# Patient Record
Sex: FEMALE | Race: WHITE | NOT HISPANIC OR LATINO | Employment: FULL TIME | URBAN - METROPOLITAN AREA
[De-identification: names, ages, dates, MRNs, and addresses within clinical notes are randomized per-mention and may not be internally consistent; named-entity substitution may affect disease eponyms.]

---

## 2018-01-20 ENCOUNTER — HOSPITAL ENCOUNTER (EMERGENCY)
Facility: HOSPITAL | Age: 53
Discharge: HOME | End: 2018-01-20
Attending: EMERGENCY MEDICINE | Admitting: EMERGENCY MEDICINE
Payer: COMMERCIAL

## 2018-01-20 VITALS
TEMPERATURE: 97.1 F | HEART RATE: 56 BPM | OXYGEN SATURATION: 90 % | HEIGHT: 65 IN | WEIGHT: 175 LBS | BODY MASS INDEX: 29.16 KG/M2 | DIASTOLIC BLOOD PRESSURE: 58 MMHG | RESPIRATION RATE: 19 BRPM | SYSTOLIC BLOOD PRESSURE: 96 MMHG

## 2018-01-20 DIAGNOSIS — R51.9 HEADACHE: Primary | ICD-10-CM

## 2018-01-20 DIAGNOSIS — R42 DIZZINESS: ICD-10-CM

## 2018-01-20 LAB
ANION GAP BLD CALC-SCNC: 15 MMOL/L (ref 4–13)
BUN BLD-MCNC: 9 MG/DL (ref 5–25)
CA-I BLD-SCNC: 1.08 MMOL/L (ref 1.12–1.32)
CHLORIDE BLD-SCNC: 102 MMOL/L (ref 100–108)
CREAT BLD-MCNC: 0.6 MG/DL (ref 0.6–1.3)
GFR SERPL CREATININE-BSD FRML MDRD: 104 ML/MIN/1.73SQ M
GLUCOSE SERPL-MCNC: 104 MG/DL (ref 65–140)
HCT VFR BLD CALC: 41 % (ref 34.8–46.1)
HGB BLDA-MCNC: 13.9 G/DL (ref 11.5–15.4)
PCO2 BLD: 28 MMOL/L (ref 21–32)
POTASSIUM BLD-SCNC: 3.9 MMOL/L (ref 3.5–5.3)
SODIUM BLD-SCNC: 139 MMOL/L (ref 136–145)
SPECIMEN SOURCE: ABNORMAL
SPECIMEN SOURCE: NORMAL
TROPONIN I BLD-MCNC: 0.01 NG/ML (ref 0–0.08)

## 2018-01-20 PROCEDURE — 93005 ELECTROCARDIOGRAM TRACING: CPT | Performed by: EMERGENCY MEDICINE

## 2018-01-20 PROCEDURE — 85014 HEMATOCRIT: CPT

## 2018-01-20 PROCEDURE — 96374 THER/PROPH/DIAG INJ IV PUSH: CPT

## 2018-01-20 PROCEDURE — 84484 ASSAY OF TROPONIN QUANT: CPT

## 2018-01-20 PROCEDURE — 80047 BASIC METABLC PNL IONIZED CA: CPT

## 2018-01-20 PROCEDURE — 99284 EMERGENCY DEPT VISIT MOD MDM: CPT

## 2018-01-20 PROCEDURE — 93005 ELECTROCARDIOGRAM TRACING: CPT

## 2018-01-20 PROCEDURE — 96361 HYDRATE IV INFUSION ADD-ON: CPT

## 2018-01-20 RX ORDER — METOCLOPRAMIDE HYDROCHLORIDE 5 MG/ML
10 INJECTION INTRAMUSCULAR; INTRAVENOUS ONCE
Status: COMPLETED | OUTPATIENT
Start: 2018-01-20 | End: 2018-01-20

## 2018-01-20 RX ADMIN — SODIUM CHLORIDE 1000 ML: 0.9 INJECTION, SOLUTION INTRAVENOUS at 22:08

## 2018-01-20 RX ADMIN — METOCLOPRAMIDE 10 MG: 5 INJECTION, SOLUTION INTRAMUSCULAR; INTRAVENOUS at 22:51

## 2018-01-21 LAB
ATRIAL RATE: 60 BPM
P AXIS: 13 DEGREES
PR INTERVAL: 144 MS
QRS AXIS: 25 DEGREES
QRSD INTERVAL: 82 MS
QT INTERVAL: 472 MS
QTC INTERVAL: 472 MS
T WAVE AXIS: 8 DEGREES
VENTRICULAR RATE: 60 BPM

## 2018-01-21 NOTE — ED PROVIDER NOTES
History  Chief Complaint   Patient presents with    Headache     pt presents to ER stating she has had a headache and "neurological problems" pt was just at [facility], had a cat scan and fluids and was discharged, pt states she is dehydrated and needs fluids      THIS IS A 47YEAR-OLD FEMALE WHO PRESENTS FOR EVALUATION DIZZINESS LIGHTHEADEDNESS RECURRING HEADACHES OF UNDETERMINED DURATION SHE WAS JUST SEEN AT [facility] DECIDED TO STOP IN HERE BECAUSE SHE STILL FEELS DIZZY AND THINKS SHE MAY NEED SOME MORE IV FLUID  SHE DENIES ANY CHEST PAIN OR SHORTNESS OF BREATH THERE IS NO FAMILY HISTORY PREMATURE CARDIAC DISEASE IN FIRST-DEGREE RELATIVES  She does smoke and has hypertension denies hypercholesterolemia or diabetes she was evaluated at [facility] earlier today had a CT scan which shows nonspecific white matter disease differential includes microangiopathic disease demyelination Lyme disease migraines or vasculitis she was given referral to see a neurologist and a prescription for Toradol tablets for the headaches  History provided by:  Patient  Medical Problem   Location:   multiple complaints including recurrent headaches lightheadedness dizziness  Onset quality:  Unable to specify  Progression:  Waxing and waning  Chronicity:  Recurrent  Associated symptoms: headaches    Associated symptoms: no chest pain, no fever and no shortness of breath        None       History reviewed  No pertinent past medical history  Past Surgical History:   Procedure Laterality Date     SECTION      THYROIDECTOMY         History reviewed  No pertinent family history  I have reviewed and agree with the history as documented      Social History   Substance Use Topics    Smoking status: Current Every Day Smoker    Smokeless tobacco: Never Used    Alcohol use Yes      Comment: ocassionally        Review of Systems   Constitutional: Negative for fever  Respiratory: Negative for shortness of breath  Cardiovascular: Negative for chest pain  Neurological: Positive for dizziness, weakness, light-headedness and headaches  All other systems reviewed and are negative  Physical Exam  ED Triage Vitals [01/20/18 2153]   Temperature Pulse Respirations Blood Pressure SpO2   (!) 97 1 °F (36 2 °C) 56 18 (!) 183/84 96 %      Temp Source Heart Rate Source Patient Position - Orthostatic VS BP Location FiO2 (%)   Temporal Monitor Lying Left arm --      Pain Score       No Pain           Orthostatic Vital Signs  Vitals:    01/20/18 2153   BP: (!) 183/84   Pulse: 56   Patient Position - Orthostatic VS: Lying       Physical Exam   Constitutional: She is oriented to person, place, and time  She appears well-developed and well-nourished  HENT:   Head: Normocephalic and atraumatic  Right Ear: External ear normal    Left Ear: External ear normal    Nose: Nose normal    Mouth/Throat: Oropharynx is clear and moist    Eyes: Conjunctivae and EOM are normal  Pupils are equal, round, and reactive to light  No scleral icterus  Neck: Normal range of motion  Neck supple  No tracheal deviation present  Cardiovascular: Regular rhythm and intact distal pulses  Exam reveals no gallop and no friction rub  No murmur heard  Pulmonary/Chest: Effort normal and breath sounds normal  No stridor  No respiratory distress  She has no wheezes  She has no rales  Abdominal: Soft  Bowel sounds are normal  She exhibits no distension and no mass  There is no tenderness  There is no guarding  Musculoskeletal: Normal range of motion  She exhibits no edema, tenderness or deformity  Neurological: She is alert and oriented to person, place, and time  No cranial nerve deficit or sensory deficit  She exhibits normal muscle tone  Coordination normal    Skin: Skin is warm and dry  No rash noted  She is not diaphoretic     Psychiatric: Thought content normal    Anxious ED Medications  Medications   sodium chloride 0 9 % bolus 1,000 mL (1,000 mL Intravenous New Bag 1/20/18 2208)   metoclopramide (REGLAN) injection 10 mg (not administered)       Diagnostic Studies  Results Reviewed     Procedure Component Value Units Date/Time    POCT troponin [[id number]]  (Normal) Collected:  01/20/18 2211    Lab Status:  Final result Updated:  01/20/18 2225     POC Troponin I 0 01 ng/ml      Specimen Type VENOUS    Narrative:         [employer] i-Stat handheld analyzer 99% cutoff is > 0 08ng/mL in [facility]    o cTnI 99% cutoff is useful only when applied to patients in the clinical setting of myocardial ischemia  o cTnI 99% cutoff should be interpreted in the context of clinical history, ECG findings and possibly cardiac imaging to establish correct diagnosis  o cTnI 99% cutoff may be suggestive but clearly not indicative of a coronary event without the clinical setting of myocardial ischemia      POCT Chem 8+ [[id number]]  (Abnormal) Collected:  01/20/18 2209    Lab Status:  Final result Updated:  01/20/18 2225     SODIUM, I-STAT 139 mmol/l      Potassium, i-STAT 3 9 mmol/L      Chloride, istat 102 mmol/L      CO2, i-STAT 28 mmol/L      Anion Gap, Istat 15 (H) mmol/L      Calcium, Ionized i-STAT 1 08 (L) mmol/L      BUN, I-STAT 9 mg/dl      Creatinine, i-STAT 0 6 mg/dl      eGFR 104 ml/min/1 73sq m      Glucose, i-STAT 104 mg/dl      Hct, i-STAT 41 %      Hgb, i-STAT 13 9 g/dl      Specimen Type VENOUS                 No orders to display              Procedures  ECG 12 Lead Documentation  Date/Time: 1/20/2018 10:18 PM  Performed by: Angie Durant  Authorized by: Angie Durant     ECG reviewed by me, the ED Provider: yes    Patient location:  ED  Rhythm:     Rhythm: sinus rhythm    T waves:     T waves: non-specific             Phone Contacts  ED Phone Contact    ED Course  ED Course          HEART Risk Score    Flowsheet Row Most Recent Value   History  0 Filed at: 01/20/2018 2233   ECG  1 Filed at: 01/20/2018 2233   Age  1 Filed at: 01/20/2018 2233   Risk Factors  1 Filed at: 01/20/2018 2233   Troponin  0 Filed at: 01/20/2018 2233   Heart Score Risk Calculator   History  0 Filed at: 01/20/2018 2233   ECG  1 Filed at: 01/20/2018 2233   Age  1 Filed at: 01/20/2018 2233   Risk Factors  1 Filed at: 01/20/2018 2233   Troponin  0 Filed at: 01/20/2018 2233   HEART Score  3 Filed at: 01/20/2018 2233   HEART Score  3 Filed at: 01/20/2018 2233                            MDM  Number of Diagnoses or Management Options  Diagnosis management comments:  Lightheaded and dizziness differential includes neurologic etiology the demyelinating disease Lyme disease migraine vasculitis no acute focal neurologic deficit no chest pain at this time no shortness of breath will check EKG basic electrolytes give IV fluids patient will need neurology follow-up as she was given earlier today at [address] [place] [address] Rd and/or Complexity of Data Reviewed  Clinical lab tests: ordered      CritCare Time    Disposition  Final diagnoses:   Headache   Dizziness     Time reflects when diagnosis was documented in both MDM as applicable and the Disposition within this note     Time User Action Codes Description Comment    1/20/2018 10:43 PM Kristen Yoon Add [R51] Headache     1/20/2018 10:43 PM Kristen Yoon Add [R42] Dizziness       ED Disposition     ED Disposition Condition Comment    Discharge  Doneta James Saucedo discharge to home/self care  Condition at discharge: Stable        Follow-up Information     Follow up With Specialties Details Why Contact Saira Wu  In 2 days  [address] [facility]  [address] DO Miya Neurology In 2 days call monday for appointment Kena Nascimento [place] [address] MD Cardiology, Multidisciplinary In 3 days follow-up on  [facility]  [address] [facility] [place] [address]  [phone]          Patient's Medications    No medications on file     No discharge procedures on file      ED Provider  Electronically Signed by           Kaya Cash DO  01/20/18 8142

## 2018-01-21 NOTE — DISCHARGE INSTRUCTIONS
Acute Headache   WHAT YOU NEED TO KNOW:   An acute headache is pain or discomfort that starts suddenly and gets worse quickly  You may have an acute headache only when you feel stress or eat certain foods  Other acute headache pain can happen every day, and sometimes several times a day  DISCHARGE INSTRUCTIONS:   Seek care immediately if:   · You have severe pain  · You have numbness or weakness on one side of your face or body  · You have a headache that occurs after a blow to the head, a fall, or other trauma  · You have a headache, are forgetful or confused, or have trouble speaking  · You have a headache, stiff neck, and a fever  Contact your healthcare provider if:   · You have a constant headache and are vomiting  · You have a headache each day that does not get better, even after treatment  · You have changes in your headaches, or new symptoms that occur when you have a headache  · You have questions or concerns about your condition or care  Medicines: You may need any of the following:  · Prescription pain medicine  may be given  The medicine your healthcare provider recommends will depend on the kind of headaches you have  You will need to take prescription headache medicines as directed to prevent a problem called rebound headache  These headaches happen with regular use of pain relievers for headache disorders  · NSAIDs , such as ibuprofen, help decrease swelling, pain, and fever  This medicine is available with or without a doctor's order  NSAIDs can cause stomach bleeding or kidney problems in certain people  If you take blood thinner medicine, always ask your healthcare provider if NSAIDs are safe for you  Always read the medicine label and follow directions  · Acetaminophen  decreases pain and fever  It is available without a doctor's order  Ask how much to take and how often to take it  Follow directions   Read the labels of all other medicines you are using to see if they also contain acetaminophen, or ask your doctor or pharmacist  Acetaminophen can cause liver damage if not taken correctly  Do not use more than 3 grams (3,000 milligrams) total of acetaminophen in one day  · Antidepressants  may be given for some kinds of headaches  · Take your medicine as directed  Contact your healthcare provider if you think your medicine is not helping or if you have side effects  Tell him or her if you are allergic to any medicine  Keep a list of the medicines, vitamins, and herbs you take  Include the amounts, and when and why you take them  Bring the list or the pill bottles to follow-up visits  Carry your medicine list with you in case of an emergency  Manage your symptoms:   · Apply heat or ice  on the headache area  Use a heat or ice pack  For an ice pack, you can also put crushed ice in a plastic bag  Cover the pack or bag with a towel before you apply it to your skin  Ice and heat both help decrease pain, and heat also helps decrease muscle spasms  Apply heat for 20 to 30 minutes every 2 hours  Apply ice for 15 to 20 minutes every hour  Apply heat or ice for as long and for as many days as directed  You may alternate heat and ice  · Relax your muscles  Lie down in a comfortable position and close your eyes  Relax your muscles slowly  Start at your toes and work your way up your body  · Keep a record of your headaches  Write down when your headaches start and stop  Include your symptoms and what you were doing when the headache began  Record what you ate or drank for 24 hours before the headache started  Describe the pain and where it hurts  Keep track of what you did to treat your headache and if it worked  Prevent an acute headache:   · Avoid anything that triggers an acute headache  Examples include exposure to chemicals, going to high altitude, or not getting enough sleep  Create a regular sleep routine   Go to sleep at the same time and wake up at the same time each day  Do not use electronic devices before bedtime  These may trigger a headache or prevent you from sleeping well  · Do not smoke  Nicotine and other chemicals in cigarettes and cigars can trigger an acute headache or make it worse  Ask your healthcare provider for information if you currently smoke and need help to quit  E-cigarettes or smokeless tobacco still contain nicotine  Talk to your healthcare provider before you use these products  · Limit alcohol as directed  Alcohol can trigger an acute headache or make it worse  If you have cluster headaches, do not drink alcohol during an episode  For other types of headaches, ask your healthcare provider if it is safe for you to drink alcohol  Ask how much is safe for you to drink, and how often  · Exercise as directed  Exercise can reduce tension and help with headache pain  Aim for 30 minutes of physical activity on most days of the week  Your healthcare provider can help you create an exercise plan  · Eat a variety of healthy foods  Healthy foods include fruits, vegetables, low-fat dairy products, lean meats, fish, whole grains, and cooked beans  Your healthcare provider or dietitian can help you create meals plans if you need to avoid foods that trigger headaches  Follow up with your healthcare provider as directed:  Bring your headache record with you when you see your healthcare provider  Write down your questions so you remember to ask them during your visits  © 2017 [employer] Information is for End User's use only and may not be sold, redistributed or otherwise used for commercial purposes  All illustrations and images included in CareNotes® are the copyrighted property of A [employer]  or [employer]  The above information is an  only  It is not intended as medical advice for individual conditions or treatments   Talk to your doctor, nurse or pharmacist before following any medical regimen to see if it is safe and effective for you  WHAT YOU NEED TO KNOW:   Near syncope, also called presyncope, is the feeling that you may faint (lose consciousness), but you do not  You can control some health conditions that cause near syncope  Your healthcare providers can help you create a plan to manage near syncope and prevent episodes  DISCHARGE INSTRUCTIONS:   Return to the emergency department if:   · You have sudden chest pain  · You have trouble breathing or shortness of breath  · You have vision changes, are sweating, and have nausea while you are sitting or lying down  · You feel dizzy or flushed and your heart is fluttering  · You lose consciousness  · You cannot use your arm, hand, foot, or leg, or it feels weak  · You have trouble speaking or understanding others when they speak  Contact your healthcare provider if:   · You have new or worsening symptoms  · Your heart beats faster or slower than usual      · You have questions or concerns about your condition or care  Follow up with your healthcare provider as directed: You may need more tests to help find the cause of your near syncope episodes  The tests will help healthcare providers plan the best treatment for you  Write down your questions so you remember to ask them during your visits  Manage near syncope:   · Sit or lie down when needed  This includes when you feel dizzy, your throat is getting tight, and your vision changes  Raise your legs above the level of your heart  · Take slow, deep breaths if you start to breathe faster with anxiety or fear  This can help decrease dizziness and the feeling that you might faint  · Keep a record of your near syncope episodes  Include your symptoms and your activity before and after the episode  The record can help your healthcare provider find the cause of your near syncope and help you manage episodes    Prevent a near syncope episode:   · Move slowly and let yourself get used to one position before you move to another position  This is very important when you change from a lying or sitting position to a standing position  Take some deep breaths before you stand up from a lying position  Stand up slowly  Sudden movements may cause a fainting spell  Sit on the side of the bed or couch for a few minutes before you stand up  If you are on bedrest, try to be upright for about 2 hours each day, or as directed  Do not lock your legs if you are standing for a long period of time  Move your legs and bend your knees to keep blood flowing  · Follow your healthcare provider's recommendations  Your provider may  recommend that you drink more liquids to prevent dehydration  You may also need to have more salt to keep your blood pressure from dropping too low and causing syncope  Your provider will tell you how much liquid and sodium to have each day  · Watch for signs of low blood sugar  These include hunger, nervousness, sweating, and fast or fluttery heartbeats  Talk with your healthcare provider about ways to keep your blood sugar level steady  · Check your blood pressure often  This is important if you take medicine to lower your blood pressure  Check your blood pressure when you are lying down and when you are standing  Ask how often to check during the day  Keep a record of your blood pressure numbers  Your healthcare provider may use the record to help plan your treatment  · Do not strain if you are constipated  You may faint if you strain to have a bowel movement  Walking is the best way to get your bowels moving  Eat foods high in fiber to make it easier to have a bowel movement  Good examples are high-fiber cereals, beans, vegetables, and whole-grain breads  Prune juice may help make bowel movements softer  · Do not exercise outside on a hot day  The combination of physical activity and heat can lead to dehydration   This can cause syncope  © 2017 [employer] [facility] Information is for End User's use only and may not be sold, redistributed or otherwise used for commercial purposes  All illustrations and images included in CareNotes® are the copyrighted property of [employer]  or [employer]  The above information is an  only  It is not intended as medical advice for individual conditions or treatments  Talk to your doctor, nurse or pharmacist before following any medical regimen to see if it is safe and effective for you

## 2018-01-22 ENCOUNTER — HOSPITAL ENCOUNTER (EMERGENCY)
Facility: HOSPITAL | Age: 53
Discharge: HOME | End: 2018-01-22
Attending: EMERGENCY MEDICINE
Payer: COMMERCIAL

## 2018-01-22 VITALS
BODY MASS INDEX: 29.99 KG/M2 | SYSTOLIC BLOOD PRESSURE: 153 MMHG | OXYGEN SATURATION: 97 % | RESPIRATION RATE: 18 BRPM | TEMPERATURE: 97.3 F | HEART RATE: 74 BPM | DIASTOLIC BLOOD PRESSURE: 78 MMHG | HEIGHT: 65 IN | WEIGHT: 180 LBS

## 2018-01-22 DIAGNOSIS — R42 DIZZY: Primary | ICD-10-CM

## 2018-01-22 LAB — GLUCOSE SERPL-MCNC: 116 MG/DL (ref 65–140)

## 2018-01-22 PROCEDURE — 82948 REAGENT STRIP/BLOOD GLUCOSE: CPT

## 2018-01-22 NOTE — ED NOTES
Patient seen walking out of department stating, "I feel better, I'm leaving"      Ara Pisano, RN  01/22/18 8535

## 2018-01-23 NOTE — ED PROVIDER NOTES
History  Chief Complaint   Patient presents with    Dizziness     Patient states, "I have a headache and I feel shakey, like my blood sugar is low " Patient denies history of diabetes  Patient reports signing in at [facility] but then leaving  Patient appears very anxious during triage  Pt states she hasn't felt well for a while, has had a few visits for dizzy/lightheadedness and headaches and presents today w/ similar complaints  States she had a mild to moderate ha, throbbing, frontal, no significant radiation  No changes in vision or speech  States she feels lightheaded and dizzy  Upon further questioning there seems to be a movement/vertigo component  During my initial interview with the patient, I was called away for an EMS command call  I was returning to her room after being away for only a few minutes and the patient was dressed and leaving, now stating she felt better  Attempted to get the patient to come back into the room to obtain more information and to continue the evaluation, but she said she was fine and walked out  History provided by:  Patient   used: No        None       Past Medical History:   Diagnosis Date    HTN (hypertension)        Past Surgical History:   Procedure Laterality Date     SECTION      THYROIDECTOMY      THYROIDECTOMY         History reviewed  No pertinent family history  I have reviewed and agree with the history as documented      Social History   Substance Use Topics    Smoking status: Current Every Day Smoker    Smokeless tobacco: Never Used    Alcohol use Yes      Comment: ocassionally        Review of Systems    Physical Exam  ED Triage Vitals [18 1032]   Temperature Pulse Respirations Blood Pressure SpO2   (!) 97 3 °F (36 3 °C) 74 18 153/78 97 %      Temp Source Heart Rate Source Patient Position - Orthostatic VS BP Location FiO2 (%)   Oral Monitor Sitting Right arm --      Pain Score       No Pain Orthostatic Vital Signs  Vitals:    01/22/18 1032   BP: 153/78   Pulse: 74   Patient Position - Orthostatic VS: Sitting       Physical Exam   Constitutional: She appears well-developed and well-nourished  Vitals reviewed  ED Medications  Medications - No data to display    Diagnostic Studies  Results Reviewed     Procedure Component Value Units Date/Time    Fingerstick Glucose (POCT) [[id number]]  (Normal) Collected:  01/22/18 1029    Lab Status:  Final result Updated:  01/22/18 1031     POC Glucose 116 mg/dl                  No orders to display              Procedures  Procedures       Phone Contacts  ED Phone Contact    ED Course  ED Course                                MDM  CritCare Time    Disposition  Final diagnoses:   Dizzy     Time reflects when diagnosis was documented in both MDM as applicable and the Disposition within this note     Time User Action Codes Description Comment    1/22/2018  7:49 PM Trey, [address] [place] [address] [R42] Dizzy       ED Disposition     ED Disposition Condition Comment    Eloped        Follow-up Information    None       There are no discharge medications for this patient  No discharge procedures on file      ED Provider  Electronically Signed by           Iggy Ornelas DO  01/24/18 1041

## 2018-05-16 DIAGNOSIS — E03.9 HYPOTHYROIDISM, UNSPECIFIED TYPE: Primary | ICD-10-CM

## 2018-05-16 RX ORDER — LEVOTHYROXINE SODIUM 0.12 MG/1
TABLET ORAL
Qty: 93 TABLET | Refills: 0 | Status: SHIPPED | OUTPATIENT
Start: 2018-05-16 | End: 2018-07-24 | Stop reason: SDUPTHER

## 2018-05-18 DIAGNOSIS — E03.9 HYPOTHYROIDISM, UNSPECIFIED TYPE: Primary | ICD-10-CM

## 2018-05-18 RX ORDER — LEVOTHYROXINE SODIUM 125 UG/1
TABLET ORAL
Qty: 93 TABLET | Refills: 0 | Status: SHIPPED | OUTPATIENT
Start: 2018-05-18 | End: 2018-07-24 | Stop reason: SDUPTHER

## 2018-07-10 LAB — HBA1C MFR BLD HPLC: 6 %

## 2018-07-24 ENCOUNTER — OFFICE VISIT (OUTPATIENT)
Dept: ENDOCRINOLOGY | Facility: HOSPITAL | Age: 53
End: 2018-07-24
Payer: COMMERCIAL

## 2018-07-24 VITALS
HEART RATE: 88 BPM | HEIGHT: 65 IN | SYSTOLIC BLOOD PRESSURE: 164 MMHG | BODY MASS INDEX: 27.92 KG/M2 | DIASTOLIC BLOOD PRESSURE: 90 MMHG | WEIGHT: 167.6 LBS

## 2018-07-24 DIAGNOSIS — R73.03 PREDIABETES: ICD-10-CM

## 2018-07-24 DIAGNOSIS — Z86.39 HISTORY OF HASHIMOTO THYROIDITIS: ICD-10-CM

## 2018-07-24 DIAGNOSIS — E89.0 POSTOPERATIVE HYPOTHYROIDISM: Primary | ICD-10-CM

## 2018-07-24 DIAGNOSIS — E03.9 HYPOTHYROIDISM, UNSPECIFIED TYPE: ICD-10-CM

## 2018-07-24 PROCEDURE — 99244 OFF/OP CNSLTJ NEW/EST MOD 40: CPT | Performed by: INTERNAL MEDICINE

## 2018-07-24 RX ORDER — OMEPRAZOLE 20 MG/1
20 CAPSULE, DELAYED RELEASE ORAL DAILY
COMMUNITY
End: 2020-03-02

## 2018-07-24 RX ORDER — LOSARTAN POTASSIUM AND HYDROCHLOROTHIAZIDE 25; 100 MG/1; MG/1
1 TABLET ORAL DAILY
COMMUNITY
Start: 2018-06-23 | End: 2021-08-17

## 2018-07-24 RX ORDER — AMITRIPTYLINE HYDROCHLORIDE 10 MG/1
5 TABLET, FILM COATED ORAL
COMMUNITY
Start: 2018-05-29

## 2018-07-24 RX ORDER — LEVOTHYROXINE SODIUM 125 UG/1
TABLET ORAL
Qty: 96 TABLET | Refills: 0
Start: 2018-07-24 | End: 2018-09-14 | Stop reason: SDUPTHER

## 2018-07-24 RX ORDER — MOMETASONE FUROATE 50 UG/1
2 SPRAY, METERED NASAL DAILY
COMMUNITY

## 2018-07-24 RX ORDER — LORATADINE 10 MG/1
1 CAPSULE, LIQUID FILLED ORAL AS NEEDED
COMMUNITY

## 2018-07-24 NOTE — LETTER
July 24, 2018     [id number] [facility] [address]  [facility] [address]    Patient: Breann Saucedo   YOB: 1965   Date of Visit: 7/24/2018       Dear Dr Usman Story: Thank you for referring Paige Garner to me for evaluation  Below are my notes for this consultation  If you have questions, please do not hesitate to call me  I look forward to following your patient along with you  Sincerely,        Beni Sanders DO        CC: No Recipients  Beni Sanders DO  7/24/2018 11:14 AM  Sign at close encounter  7/24/2018    Assessment/Plan      Diagnoses and all orders for this visit:    Postoperative hypothyroidism  -     TSH, 3rd generation Lab Collect; Future  -     T4, free Lab Collect; Future  -     TSH, 3rd generation Lab Collect  -     T4, free Lab Collect  -     TSH, 3rd generation Lab Collect; Future  -     T4, free Lab Collect; Future  -     TSH, 3rd generation Lab Collect  -     T4, free Lab Collect    Hypothyroidism, unspecified type  -     SYNTHROID 125 MCG tablet; TAKE 1 TABLET DAILY 6 days of the week and 1 5 tabs the 7th day  BRAND NECESSARY  History of Hashimoto thyroiditis    Prediabetes  -     Comprehensive metabolic panel Lab Collect; Future  -     HEMOGLOBIN A1C W/ EAG ESTIMATION Lab Collect; Future  -     Comprehensive metabolic panel Lab Collect  -     HEMOGLOBIN A1C W/ EAG ESTIMATION Lab Collect    Other orders  -     amitriptyline (ELAVIL) 10 mg tablet;   -     losartan-hydrochlorothiazide (HYZAAR) 100-25 MG per tablet;   -     Loratadine (CLARITIN) 10 MG CAPS; Take by mouth  -     mometasone (NASONEX) 50 mcg/act nasal spray; 2 sprays into each nostril daily  -     omeprazole (PriLOSEC) 20 mg delayed release capsule; Take 20 mg by mouth daily        Assessment/Plan:  1  Postoperative hypothyroidism:  Given occasional fatigue, will increase dose slightly to Synthroid brand 125 mcg tablets, 1 tablet 6 days of the week 1 5 tablets on Sundays    Plan to repeat TSH and free T4 in 2 months and we will call her with these results  We will plan to see her back in 1 year with labs just prior  2   Prediabetes:  Based on fasting sugar of 114 an A1c of 6 0  She will continue to be mindful of diet, exercise, lifestyle  We will continue to monitor this on an annual basis  CC:   Hypothyroidism    History of Present Illness     HPI: Blanca Grant is a 47y o  year old female with history of Hashimoto's thyroiditis, hypothyroidism due to surgery, prediabetes who presents to [facility] care  Previous patient of Dr Chasidy Howell  In 2010, she underwent thyroidectomy for concerns regarding thyroid cancer  Fortunately, pathology came back benign  She has then been maintained on Synthroid brand 125 mcg daily with an extra half tablet 2 days of the month for replacement purposes  Overall, she feels okay  She takes her medication appropriately  She does note occasional fatigue but does admit to a busy life  She denies any neck compressive symptoms or concerns in that regard  She also has a history of prediabetes and recent blood work suggested the same  She denies history of overt diabetes or gestational diabetes  Review of Systems   Constitutional: Positive for fatigue (occasionally)  Negative for chills and fever  HENT: Negative for trouble swallowing and voice change  Eyes: Negative for visual disturbance  Respiratory: Negative for shortness of breath  Cardiovascular: Negative for chest pain, palpitations and leg swelling  Gastrointestinal: Negative for abdominal pain, nausea and vomiting  Endocrine: Negative for cold intolerance, heat intolerance, polydipsia and polyuria  Musculoskeletal: Negative for arthralgias and myalgias  Skin: Negative for rash  Neurological: Negative for dizziness, tremors and weakness  Hematological: Negative for adenopathy  Psychiatric/Behavioral: Negative for agitation and confusion         Historical Information Past Medical History:   Diagnosis Date    HTN (hypertension)      Past Surgical History:   Procedure Laterality Date     SECTION      THYROIDECTOMY      THYROIDECTOMY       Social History   History   Alcohol Use    Yes     Comment: ocassionally     History   Drug Use No     History   Smoking Status    Current Every Day Smoker    Types: Cigarettes   Smokeless Tobacco    Never Used     Comment: 6 cigarettes per day  Family History:   Family History   Problem Relation Age of Onset    Hypertension Mother     Ovarian cancer Mother     Hypothyroidism Mother     Hypertension Father     Hyperlipidemia Father     Heart disease Father     Hypertension Sister     Hypertension Brother     Hypertension Daughter        Meds/Allergies   Current Outpatient Prescriptions   Medication Sig Dispense Refill    amitriptyline (ELAVIL) 10 mg tablet       Loratadine (CLARITIN) 10 MG CAPS Take by mouth      losartan-hydrochlorothiazide (HYZAAR) 100-25 MG per tablet       mometasone (NASONEX) 50 mcg/act nasal spray 2 sprays into each nostril daily      omeprazole (PriLOSEC) 20 mg delayed release capsule Take 20 mg by mouth daily      SYNTHROID 125 MCG tablet TAKE 1 TABLET DAILY 6 days of the week and 1 5 tabs the 7th day  BRAND NECESSARY  96 tablet 0     No current facility-administered medications for this visit  No Known Allergies    Objective   Vitals: Blood pressure 164/90, pulse 88, height 5' 5" (1 651 m), weight 76 kg (167 lb 9 6 oz)  Invasive Devices          No matching active lines, drains, or airways          Physical Exam   Constitutional: She is oriented to person, place, and time  She appears well-developed and well-nourished  No distress  HENT:   Head: Normocephalic and atraumatic  Eyes: Conjunctivae and EOM are normal  Pupils are equal, round, and reactive to light  Neck: Normal range of motion  Neck supple  Thyromegaly: no nodules in thyroid bed     Cardiovascular: Normal rate and regular rhythm  No murmur heard  Pulmonary/Chest: Effort normal and breath sounds normal    Abdominal: Soft  Bowel sounds are normal  She exhibits no distension  There is no tenderness  Musculoskeletal: Normal range of motion  She exhibits no edema  Lymphadenopathy:     She has no cervical adenopathy  Neurological: She is alert and oriented to person, place, and time  She exhibits normal muscle tone  Skin: Skin is warm and dry  No rash noted  She is not diaphoretic  Psychiatric: She has a normal mood and affect  Her behavior is normal        The history was obtained from the review of the chart and from the patient      Lab Results:      Recent Results (from the past 55879 hour(s))   ECG 12 lead    Collection Time: 01/20/18  9:58 PM   Result Value Ref Range    Ventricular Rate 60 BPM    Atrial Rate 60 BPM    GA Interval 144 ms    QRSD Interval 82 ms    QT Interval 472 ms    QTC Interval 472 ms    P [place]ott 13 degrees    QRS Axis 25 degrees    T Wave Axis 8 degrees   POCT Chem 8+    Collection Time: 01/20/18 10:09 PM   Result Value Ref Range    SODIUM, I-STAT 139 136 - 145 mmol/l    Potassium, i-STAT 3 9 3 5 - 5 3 mmol/L    Chloride, istat 102 100 - 108 mmol/L    CO2, i-STAT 28 21 - 32 mmol/L    Anion Gap, Istat 15 (H) 4 - 13 mmol/L    Calcium, Ionized i-STAT 1 08 (L) 1 12 - 1 32 mmol/L    BUN, I-STAT 9 5 - 25 mg/dl    Creatinine, i-STAT 0 6 0 6 - 1 3 mg/dl    eGFR 104 ml/min/1 73sq m    Glucose, i-STAT 104 65 - 140 mg/dl    Hct, i-STAT 41 34 8 - 46 1 %    Hgb, i-STAT 13 9 11 5 - 15 4 g/dl    Specimen Type VENOUS    POCT troponin    Collection Time: 01/20/18 10:11 PM   Result Value Ref Range    POC Troponin I 0 01 0 00 - 0 08 ng/ml    Specimen Type VENOUS    Fingerstick Glucose (POCT)    Collection Time: 01/22/18 10:29 AM   Result Value Ref Range    POC Glucose 116 65 - 140 mg/dl     Labs from [facility] 7/10/18:  Glucose 114, BUN 12, creatinine 0 7, GFR 99, sodium 140, potassium 4 1, calcium 9 6, albumin 4 4, alk phos 76, AST 19, ALT 29, total cholesterol 190, , triglycerides 174, HDL 43, TSH 2 83, total T4 8 0, free thyroxine index 2 3, A1c 6 0      Future Appointments  Date Time Provider Megan Zavala   7/30/2019 10:10 AM Virgil Lux DO [facility]

## 2018-09-14 DIAGNOSIS — E03.9 HYPOTHYROIDISM, UNSPECIFIED TYPE: Primary | ICD-10-CM

## 2018-09-14 DIAGNOSIS — E03.9 HYPOTHYROIDISM, UNSPECIFIED TYPE: ICD-10-CM

## 2018-09-14 LAB
T4 FREE SERPL-MCNC: 1.42 NG/DL (ref 0.82–1.77)
TSH SERPL DL<=0.005 MIU/L-ACNC: 3.75 UIU/ML (ref 0.45–4.5)

## 2018-09-14 RX ORDER — LEVOTHYROXINE SODIUM 125 UG/1
TABLET ORAL
Qty: 102 TABLET | Refills: 0
Start: 2018-09-14 | End: 2018-11-21 | Stop reason: SDUPTHER

## 2018-11-21 DIAGNOSIS — E03.9 HYPOTHYROIDISM, UNSPECIFIED TYPE: ICD-10-CM

## 2018-11-21 RX ORDER — LEVOTHYROXINE SODIUM 125 UG/1
TABLET ORAL
Qty: 93 TABLET | Refills: 0 | Status: SHIPPED | OUTPATIENT
Start: 2018-11-21 | End: 2019-02-10 | Stop reason: SDUPTHER

## 2018-11-28 LAB
T4 FREE SERPL-MCNC: 1.64 NG/DL (ref 0.82–1.77)
TSH SERPL DL<=0.005 MIU/L-ACNC: 2.25 UIU/ML (ref 0.45–4.5)

## 2019-02-10 DIAGNOSIS — E03.9 HYPOTHYROIDISM, UNSPECIFIED TYPE: ICD-10-CM

## 2019-02-12 RX ORDER — LEVOTHYROXINE SODIUM 125 UG/1
TABLET ORAL
Qty: 93 TABLET | Refills: 0 | Status: SHIPPED | OUTPATIENT
Start: 2019-02-12 | End: 2019-04-27 | Stop reason: SDUPTHER

## 2019-04-27 DIAGNOSIS — E03.9 HYPOTHYROIDISM, UNSPECIFIED TYPE: ICD-10-CM

## 2019-04-30 RX ORDER — LEVOTHYROXINE SODIUM 125 UG/1
TABLET ORAL
Qty: 93 TABLET | Refills: 0 | Status: SHIPPED | OUTPATIENT
Start: 2019-04-30 | End: 2019-07-18 | Stop reason: SDUPTHER

## 2019-07-17 LAB
ALBUMIN SERPL-MCNC: 4.3 G/DL (ref 3.5–5.5)
ALBUMIN/GLOB SERPL: 2.2 {RATIO} (ref 1.2–2.2)
ALP SERPL-CCNC: 80 IU/L (ref 39–117)
ALT SERPL-CCNC: 53 IU/L (ref 0–32)
AST SERPL-CCNC: 30 IU/L (ref 0–40)
BILIRUB SERPL-MCNC: 0.3 MG/DL (ref 0–1.2)
BUN SERPL-MCNC: 12 MG/DL (ref 6–24)
BUN/CREAT SERPL: 17 (ref 9–23)
CALCIUM SERPL-MCNC: 9.3 MG/DL (ref 8.7–10.2)
CHLORIDE SERPL-SCNC: 102 MMOL/L (ref 96–106)
CO2 SERPL-SCNC: 25 MMOL/L (ref 20–29)
CREAT SERPL-MCNC: 0.7 MG/DL (ref 0.57–1)
EST. AVERAGE GLUCOSE BLD GHB EST-MCNC: 126 MG/DL
GLOBULIN SER-MCNC: 2 G/DL (ref 1.5–4.5)
GLUCOSE SERPL-MCNC: 115 MG/DL (ref 65–99)
HBA1C MFR BLD: 6 % (ref 4.8–5.6)
POTASSIUM SERPL-SCNC: 4 MMOL/L (ref 3.5–5.2)
PROT SERPL-MCNC: 6.3 G/DL (ref 6–8.5)
SL AMB EGFR AFRICAN AMERICAN: 114 ML/MIN/1.73
SL AMB EGFR NON AFRICAN AMERICAN: 99 ML/MIN/1.73
SODIUM SERPL-SCNC: 142 MMOL/L (ref 134–144)
T4 FREE SERPL-MCNC: 1.39 NG/DL (ref 0.82–1.77)
TSH SERPL DL<=0.005 MIU/L-ACNC: 0.13 UIU/ML (ref 0.45–4.5)

## 2019-07-18 DIAGNOSIS — E03.9 HYPOTHYROIDISM, UNSPECIFIED TYPE: ICD-10-CM

## 2019-07-18 RX ORDER — LEVOTHYROXINE SODIUM 125 UG/1
TABLET ORAL
Qty: 93 TABLET | Refills: 0 | Status: SHIPPED | OUTPATIENT
Start: 2019-07-18 | End: 2019-09-17 | Stop reason: SDUPTHER

## 2019-07-30 ENCOUNTER — OFFICE VISIT (OUTPATIENT)
Dept: ENDOCRINOLOGY | Facility: HOSPITAL | Age: 54
End: 2019-07-30
Payer: COMMERCIAL

## 2019-07-30 VITALS
SYSTOLIC BLOOD PRESSURE: 138 MMHG | DIASTOLIC BLOOD PRESSURE: 84 MMHG | BODY MASS INDEX: 29.72 KG/M2 | WEIGHT: 178.4 LBS | HEIGHT: 65 IN | HEART RATE: 84 BPM

## 2019-07-30 DIAGNOSIS — E89.0 POSTOPERATIVE HYPOTHYROIDISM: Primary | ICD-10-CM

## 2019-07-30 DIAGNOSIS — R73.03 PREDIABETES: ICD-10-CM

## 2019-07-30 PROCEDURE — 99214 OFFICE O/P EST MOD 30 MIN: CPT | Performed by: INTERNAL MEDICINE

## 2019-07-30 RX ORDER — FLUOROURACIL 50 MG/G
CREAM TOPICAL
Refills: 0 | COMMUNITY
Start: 2019-06-25

## 2019-07-30 NOTE — PROGRESS NOTES
7/30/2019    Assessment/Plan      Diagnoses and all orders for this visit:    Postoperative hypothyroidism  -     TSH, 3rd generation Lab Collect; Future  -     T4, free Lab Collect; Future  -     TSH, 3rd generation Lab Collect  -     T4, free Lab Collect  -     HEMOGLOBIN A1C W/ EAG ESTIMATION Lab Collect; Future  -     Comprehensive metabolic panel Lab Collect; Future  -     TSH, 3rd generation Lab Collect; Future  -     T4, free Lab Collect; Future  -     Lipid Panel with Direct LDL reflex Lab Collect; Future  -     HEMOGLOBIN A1C W/ EAG ESTIMATION Lab Collect  -     Comprehensive metabolic panel Lab Collect  -     TSH, 3rd generation Lab Collect  -     T4, free Lab Collect  -     Lipid Panel with Direct LDL reflex Lab Collect    Prediabetes  -     HEMOGLOBIN A1C W/ EAG ESTIMATION Lab Collect; Future  -     Comprehensive metabolic panel Lab Collect; Future  -     TSH, 3rd generation Lab Collect; Future  -     T4, free Lab Collect; Future  -     Lipid Panel with Direct LDL reflex Lab Collect; Future  -     HEMOGLOBIN A1C W/ EAG ESTIMATION Lab Collect  -     Comprehensive metabolic panel Lab Collect  -     TSH, 3rd generation Lab Collect  -     T4, free Lab Collect  -     Lipid Panel with Direct LDL reflex Lab Collect    Other orders  -     fluorouracil (EFUDEX) 5 % cream; APPLY CREAM TOPICALLY TO AFFECTED AREA TWICE DAILY FOR 2 4 WEEKS AS TOLERATED        Assessment/Plan:  1  Hypothyroidism postoperative:  Continue current regimen for now  I have suggested we check a TSH and free T4 in about 2 or 3 months to re-evaluate her thyroid labs in see if the recent low TSH was suppressed from another reason as listed in HPI  We will call her with the results  If we need to adjust her dose at that time, we will do so  Otherwise we will plan to see her back in 1 year from now  2  Prediabetes:  A1c is stable  Encourage diet, exercise, lifestyle modification    Check CMP, A1c, lipid panel before next appointment which will be in 1 year  CC: Follow-up    History of Present Illness     HPI: Vahid Ortiz is a 46y o  year old female with history of hypothyroidism after surgery, prediabetes who presents for a follow-up appointment  In , she underwent surgery for nodules and fortunately pathology came back benign  She is maintained on Synthroid brand 125 mcg daily with 2 tabs on Sundays  No neck compressive symptoms  She presents today overall feeling well  She has undergone evaluation by Neurology with lumbar puncture and imaging including MRIs recently  Review of Systems   Constitutional: Negative for fatigue  HENT: Negative for trouble swallowing and voice change  Eyes: Negative for visual disturbance  Respiratory: Negative for shortness of breath  Cardiovascular: Negative for palpitations and leg swelling  Gastrointestinal: Negative for abdominal pain, nausea and vomiting  Endocrine: Negative for polydipsia and polyuria  Musculoskeletal: Negative for arthralgias and myalgias  Skin: Negative for rash  Neurological: Negative for dizziness, tremors and weakness  Hematological: Negative for adenopathy  Psychiatric/Behavioral: Negative for agitation and confusion  Historical Information   Past Medical History:   Diagnosis Date    HTN (hypertension)      Past Surgical History:   Procedure Laterality Date     SECTION      THYROIDECTOMY      THYROIDECTOMY       Social History   Social History     Substance and Sexual Activity   Alcohol Use Yes    Comment: ocassionally     Social History     Substance and Sexual Activity   Drug Use No     Social History     Tobacco Use   Smoking Status Current Every Day Smoker    Types: Cigarettes   Smokeless Tobacco Never Used   Tobacco Comment    6 cigarettes per day        Family History:   Family History   Problem Relation Age of Onset    Hypertension Mother     Ovarian cancer Mother     Hypothyroidism Mother     Hypertension Father  Hyperlipidemia Father     Heart disease Father     Hypertension Sister     Hypertension Brother     Hypertension Daughter        Meds/Allergies   Current Outpatient Medications   Medication Sig Dispense Refill    amitriptyline (ELAVIL) 10 mg tablet 5 mg       SYNTHROID 125 MCG tablet TAKE 1 TABLET BY MOUTH ONCE DAILY AND  1 5  TABLETS  ON  THE  1ST  AND  15TH  OF  EACH  MONTH (Patient taking differently: Take 1 tablet Monday- Saturday, take 2 tablets on Sunday) 93 tablet 0    fluorouracil (EFUDEX) 5 % cream APPLY CREAM TOPICALLY TO AFFECTED AREA TWICE DAILY FOR 2 4 WEEKS AS TOLERATED  0    Loratadine (CLARITIN) 10 MG CAPS Take by mouth      losartan-hydrochlorothiazide (HYZAAR) 100-25 MG per tablet       mometasone (NASONEX) 50 mcg/act nasal spray 2 sprays into each nostril daily      omeprazole (PriLOSEC) 20 mg delayed release capsule Take 20 mg by mouth daily       No current facility-administered medications for this visit  No Known Allergies    Objective   Vitals: Blood pressure 138/84, pulse 84, height 5' 5" (1 651 m), weight 80 9 kg (178 lb 6 4 oz)  Invasive Devices     None                 Physical Exam   Constitutional: She is oriented to person, place, and time  She appears well-developed and well-nourished  No distress  HENT:   Head: Normocephalic and atraumatic  Neck: Normal range of motion  Neck supple  No thyromegaly present  Cardiovascular: Normal rate and regular rhythm  Pulmonary/Chest: Effort normal and breath sounds normal  No respiratory distress  Abdominal: Soft  Bowel sounds are normal    Musculoskeletal: Normal range of motion  She exhibits no edema  Neurological: She is alert and oriented to person, place, and time  She exhibits normal muscle tone  Skin: Skin is warm and dry  No rash noted  She is not diaphoretic  Psychiatric: She has a normal mood and affect  Her behavior is normal    Vitals reviewed        The history was obtained from the review of the chart and from the patient      Lab Results:      Recent Results (from the past 11005 hour(s))   Hemoglobin A1C    Collection Time: 07/10/18 12:00 AM   Result Value Ref Range    Hemoglobin A1C 6 0    TSH, 3rd generation Lab Collect    Collection Time: 09/13/18 11:05 AM   Result Value Ref Range    TSH 3 750 0 450 - 4 500 uIU/mL   T4, free Lab Collect    Collection Time: 09/13/18 11:05 AM   Result Value Ref Range    Free t4 1 42 0 82 - 1 77 ng/dL   TSH, 3rd generation Lab Collect    Collection Time: 11/27/18  3:28 PM   Result Value Ref Range    TSH 2 250 0 450 - 4 500 uIU/mL   T4, free- Lab Collect    Collection Time: 11/27/18  3:28 PM   Result Value Ref Range    Free t4 1 64 0 82 - 1 77 ng/dL   TSH, 3rd generation Lab Collect    Collection Time: 07/16/19 11:00 AM   Result Value Ref Range    TSH 0 128 (L) 0 450 - 4 500 uIU/mL   T4, free Lab Collect    Collection Time: 07/16/19 11:00 AM   Result Value Ref Range    Free t4 1 39 0 82 - 1 77 ng/dL   Comprehensive metabolic panel Lab Collect    Collection Time: 07/16/19 11:00 AM   Result Value Ref Range    Glucose, Random 115 (H) 65 - 99 mg/dL    BUN 12 6 - 24 mg/dL    Creatinine 0 70 0 57 - 1 00 mg/dL    eGFR Non African American 99 >59 mL/min/1 73    eGFR  114 >59 mL/min/1 73    SL AMB BUN/CREATININE RATIO 17 9 - 23    Sodium 142 134 - 144 mmol/L    Potassium 4 0 3 5 - 5 2 mmol/L    Chloride 102 96 - 106 mmol/L    CO2 25 20 - 29 mmol/L    CALCIUM 9 3 8 7 - 10 2 mg/dL    Protein, Total 6 3 6 0 - 8 5 g/dL    Albumin 4 3 3 5 - 5 5 g/dL    Globulin, Total 2 0 1 5 - 4 5 g/dL    Albumin/Globulin Ratio 2 2 1 2 - 2 2    TOTAL BILIRUBIN 0 3 0 0 - 1 2 mg/dL    Alk Phos Isoenzymes 80 39 - 117 IU/L    AST 30 0 - 40 IU/L    ALT 53 (H) 0 - 32 IU/L   HEMOGLOBIN A1C W/ EAG ESTIMATION Lab Collect    Collection Time: 07/16/19 11:00 AM   Result Value Ref Range    Hemoglobin A1C 6 0 (H) 4 8 - 5 6 %    Estimated Average Glucose 126 mg/dL         No future appointments  Portions of the record may have been created with voice recognition software  Occasional wrong word or "sound a like" substitutions may have occurred due to the inherent limitations of voice recognition software  Read the chart carefully and recognize, using context, where substitutions have occurred

## 2019-09-17 ENCOUNTER — TELEPHONE (OUTPATIENT)
Dept: ENDOCRINOLOGY | Facility: HOSPITAL | Age: 54
End: 2019-09-17

## 2019-09-17 DIAGNOSIS — E03.9 HYPOTHYROIDISM, UNSPECIFIED TYPE: ICD-10-CM

## 2019-09-17 RX ORDER — LEVOTHYROXINE SODIUM 125 UG/1
TABLET ORAL
Qty: 93 TABLET | Refills: 0
Start: 2019-09-17 | End: 2019-10-30

## 2019-09-17 NOTE — TELEPHONE ENCOUNTER
I reviewed repeat labs and thyroid levels still look too high  I would suggest she adjust her Synthroid brand 125 mcg tablet dose to 1 tablet 6 days a week but only 1 5 tablets on Sundays  We should repeat a TSH and free T4 in about 6 weeks  Labs from [facility] on 09/04/2019: White blood cell 6 8, hemoglobin 14 3, platelets 822, glucose 93, BUN 12, creatinine 0 69, GFR 99, calcium 9 4, albumin 4 9, alk-phos 96, AST 28, ALT 64, total cholesterol 211, triglycerides 293, , HDL 33  TSH 0 208, free thyroxine index 2 3

## 2019-10-07 DIAGNOSIS — E03.9 HYPOTHYROIDISM, UNSPECIFIED TYPE: ICD-10-CM

## 2019-10-10 RX ORDER — LEVOTHYROXINE SODIUM 125 UG/1
TABLET ORAL
Qty: 93 TABLET | Refills: 0 | Status: SHIPPED | OUTPATIENT
Start: 2019-10-10 | End: 2019-10-30 | Stop reason: SDUPTHER

## 2019-10-30 DIAGNOSIS — E03.9 HYPOTHYROIDISM, UNSPECIFIED TYPE: ICD-10-CM

## 2019-10-30 LAB
T4 FREE SERPL-MCNC: 1.34 NG/DL (ref 0.82–1.77)
TSH SERPL DL<=0.005 MIU/L-ACNC: 0.29 UIU/ML (ref 0.45–4.5)

## 2019-10-30 RX ORDER — LEVOTHYROXINE SODIUM 125 UG/1
TABLET ORAL
Qty: 93 TABLET | Refills: 0
Start: 2019-10-30 | End: 2019-12-16 | Stop reason: SDUPTHER

## 2019-10-30 RX ORDER — LEVOTHYROXINE SODIUM 125 UG/1
TABLET ORAL
Qty: 93 TABLET | Refills: 0
Start: 2019-10-30 | End: 2019-10-30 | Stop reason: SDUPTHER

## 2019-12-16 DIAGNOSIS — E03.9 HYPOTHYROIDISM, UNSPECIFIED TYPE: ICD-10-CM

## 2019-12-16 RX ORDER — LEVOTHYROXINE SODIUM 125 UG/1
TABLET ORAL
Qty: 30 TABLET | Refills: 9 | Status: SHIPPED | OUTPATIENT
Start: 2019-12-16 | End: 2019-12-18

## 2019-12-17 LAB
T4 FREE SERPL-MCNC: 1.19 NG/DL (ref 0.82–1.77)
TSH SERPL DL<=0.005 MIU/L-ACNC: 0.21 UIU/ML (ref 0.45–4.5)

## 2019-12-18 DIAGNOSIS — E03.9 HYPOTHYROIDISM, UNSPECIFIED TYPE: Primary | ICD-10-CM

## 2019-12-18 RX ORDER — LEVOTHYROXINE SODIUM 125 UG/1
TABLET ORAL
Qty: 30 TABLET | Refills: 9
Start: 2019-12-18 | End: 2020-02-26 | Stop reason: SDUPTHER

## 2020-01-14 ENCOUNTER — TELEPHONE (OUTPATIENT)
Dept: GASTROENTEROLOGY | Facility: CLINIC | Age: 55
End: 2020-01-14

## 2020-02-04 ENCOUNTER — CLINICAL SUPPORT (OUTPATIENT)
Dept: GASTROENTEROLOGY | Facility: CLINIC | Age: 55
End: 2020-02-04

## 2020-02-04 VITALS — BODY MASS INDEX: 30.49 KG/M2 | WEIGHT: 183 LBS | HEIGHT: 65 IN

## 2020-02-04 DIAGNOSIS — Z12.11 ENCOUNTER FOR SCREENING COLONOSCOPY: Primary | ICD-10-CM

## 2020-02-04 RX ORDER — MULTIVITAMIN
1 CAPSULE ORAL DAILY
COMMUNITY

## 2020-02-04 NOTE — PROGRESS NOTES
Pt here for colon prep  Dx: screening, fam  Hx of polyps, rectal ca  Rx sent to provider for signature  Instructions for clenpiq given  Meds reviewed  Screening form signed

## 2020-02-25 LAB
T4 FREE SERPL-MCNC: 1.81 NG/DL (ref 0.82–1.77)
TSH SERPL DL<=0.005 MIU/L-ACNC: 0.16 UIU/ML (ref 0.45–4.5)

## 2020-02-26 DIAGNOSIS — E03.9 HYPOTHYROIDISM, UNSPECIFIED TYPE: ICD-10-CM

## 2020-02-26 RX ORDER — LEVOTHYROXINE SODIUM 125 UG/1
TABLET ORAL
Qty: 30 TABLET | Refills: 9
Start: 2020-02-26 | End: 2020-10-08

## 2020-02-28 LAB — HCV AB SER-ACNC: NON REACTIVE

## 2020-03-02 ENCOUNTER — TELEPHONE (OUTPATIENT)
Dept: GASTROENTEROLOGY | Facility: CLINIC | Age: 55
End: 2020-03-02

## 2020-03-02 DIAGNOSIS — D64.9 ANEMIA, UNSPECIFIED TYPE: Primary | ICD-10-CM

## 2020-03-02 RX ORDER — PANTOPRAZOLE SODIUM 40 MG/1
40 TABLET, DELAYED RELEASE ORAL DAILY
Qty: 30 TABLET | Refills: 2 | Status: SHIPPED | OUTPATIENT
Start: 2020-03-02 | End: 2020-06-12

## 2020-03-03 NOTE — TELEPHONE ENCOUNTER
Records pulled and sent to [facility] for scanning  CHRISTOPH lab still pending  Please call to schedule per GS

## 2020-03-03 NOTE — TELEPHONE ENCOUNTER
Pt being discharged from [facility] today  Admitted with altered mental status and anemia  No overt GI bleeding  Heme concerned for HLH/TTP but MS improved and no steroids started inpatient  Pt will need to follow up with Heme as outpatient for BM Bx  Pt will also need to complete GI work up for anemia  Normal iron panel  LFTs also up - but neg viral panel  1  Pt needed PPI Daily to be sent to her pharmacy (I sent Pantoprazole 40mg to [facility])    2  PLEASE CALL PATIENT TO SCHEDULE AN OFV in the next 2-4 weeks  3  Please scan in [address], last progress note, Heme Consult, last set of labs, and discharge summary

## 2020-03-17 ENCOUNTER — OFFICE VISIT (OUTPATIENT)
Dept: GASTROENTEROLOGY | Facility: CLINIC | Age: 55
End: 2020-03-17
Payer: COMMERCIAL

## 2020-03-17 VITALS
HEIGHT: 65 IN | SYSTOLIC BLOOD PRESSURE: 142 MMHG | BODY MASS INDEX: 28.32 KG/M2 | HEART RATE: 97 BPM | WEIGHT: 170 LBS | DIASTOLIC BLOOD PRESSURE: 90 MMHG

## 2020-03-17 DIAGNOSIS — Z86.010 PERSONAL HISTORY OF COLONIC POLYPS: ICD-10-CM

## 2020-03-17 DIAGNOSIS — D64.9 ANEMIA, UNSPECIFIED TYPE: Primary | ICD-10-CM

## 2020-03-17 DIAGNOSIS — Z80.0 FAMILY HISTORY OF COLON CANCER IN FATHER: ICD-10-CM

## 2020-03-17 DIAGNOSIS — K21.9 GASTROESOPHAGEAL REFLUX DISEASE, ESOPHAGITIS PRESENCE NOT SPECIFIED: ICD-10-CM

## 2020-03-17 DIAGNOSIS — R79.89 ELEVATED LFTS: ICD-10-CM

## 2020-03-17 PROBLEM — Z86.0100 PERSONAL HISTORY OF COLONIC POLYPS: Status: ACTIVE | Noted: 2020-03-17

## 2020-03-17 PROBLEM — K76.0 FATTY LIVER: Status: RESOLVED | Noted: 2020-03-17 | Resolved: 2020-03-17

## 2020-03-17 PROBLEM — K76.0 FATTY LIVER: Status: ACTIVE | Noted: 2020-03-17

## 2020-03-17 PROCEDURE — 99244 OFF/OP CNSLTJ NEW/EST MOD 40: CPT | Performed by: NURSE PRACTITIONER

## 2020-03-17 NOTE — LETTER
March 25, 2020     [address] [place] [address]    Patient: Neisha Saucedo   YOB: 1965   Date of Visit: 3/17/2020       Dear Dr Liu Forget: Thank you for referring Yoshi Lizarraga to me for evaluation  Below are my notes for this consultation  If you have questions, please do not hesitate to call me  I look forward to following your patient along with you           Sincerely,        JERAMY Gautam        CC: MD Burt Young MD

## 2020-03-17 NOTE — PROGRESS NOTES
[facility]  Maynor Duckworth Sheryl 53 y o  female MRN: [id number]  Encounter: [id number]    ASSESSMENT AND PLAN:      1  Anemia, unspecified type  Admitted to [facility] late February with anemia and confusion  Her hemoglobin was 6  She was transfused with 2 you PRBCs  Most recent hemoglobin 3/2 was 8 4  No overt signs of GI blood loss  She is currently completing a workup with Hematology for possible MDS and awaiting final bone marrow biopsy results  Less likely GI etiology  Consideration for malabsorption-celiac  Iron studies were abnormal with ferritin >4800, concerning for HHC  B12 and folate were normal     -will recheck CBC, iron studies, check celiac panel and HFE  -continue pantoprazole 40 mg daily  -eventual EGD once neurology and hematology workups are completed and she is cleared  - CBC; Future  - Celiac Disease Antibody Profile; Future  - Comprehensive metabolic panel; Future  - Fe+TIBC+Dinesh; Future  - Hemochromatosis mutation; Future      2  Gastroesophageal reflux disease, esophagitis presence not specified  Having heartburn and reflux symptoms exacerbated with spicy food, caffeine  Questionable previous heavy NSAID use and heavier EtOH  Currently no EtOH and using Tylenol  Differential considerations include PUD, H pylori, gastritis, esophagitis, Steinberg's, less likely UGI neoplasm     -will recheck CBC  -eventual EGD after completion of neurology in hematology evaluations  -continue pantoprazole 40 mg daily  -reviewed GERD diet and lifestyle modifications including avoidance of late night eating, EtOH, NSAIDs, food triggers and head elevation at night  GERD information was provided to the patient   - CBC; Future    3  Elevated LFTs  Noted on hospital presentation at [facility] in February  Likely related to previous EtOH use, steatohepatitis  CT scan showed no evidence for hepatic or biliary disease  Currently not using EtOH    HHC is also a possibility with ferritin >4800     -will recheck LFTs (CMP)  -will check HFE  -consider more in-depth workup to exclude viral, autoimmune, metabolic etiology for elevation including ultrasound at follow-up  - Comprehensive metabolic panel; Future  - Hemochromatosis mutation; Future    4  Personal history of colonic polyps  5  Family history of colon cancer in father  Father had rectal cancer making her high risk  Last colonoscopy 02/11/2020 showed internal hemorrhoids and 2 hyperplastic polyps  Five year recall -2025  Followup Appointment:  4 weeks  ______________________________________________________________________    Chief Complaint   Patient presents with    [facility] Follow up     Low Hemoglobin       HPI:   Darien High is a 53 y o   female who presents for follow-up after her recent hospitalization at [facility] late in February for anemia and confusion  Hemoglobin was originally 6 and she was transfused with 2 you PRBCs  Hepatitis panel was negative  Iron study showed a normal iron at 1:39 a m     Sat% was high at 41 and ferritin was 4870  TIBC normal at 343  She did possibly use NSAIDs heavily for arthralgias as well as almost daily EtOH but there was no overt signs of any GI blood loss  Neurology completing a workup for possible MS as etiology for her confusion  [facility] did a workup including bone marrow biopsy to evaluate for hematological etiology for anemia including MDS  Patient reports that she will be following up with Neurology today and hematology on Thursday 3/19  Since her hospitalization she is only using Tylenol for discomfort  She used ibuprofen when she had a fever back in February after her colonoscopy while she was being treated for a URI  None since  She is not drinking any alcohol  Continues with heartburn described as chest burning with occasional liquid reflux if she misses a dose of pantoprazole    Rare episodes of meat sticking in her throat when she eats too fast but this is relieved by drinking some water  Denies any other solid food dysphagia, odynophagia  Minimal caffeine  Denies fevers or chills, nausea or vomiting  She does experience occasional abdominal bloating but cannot quantify when this occurs  She does have 1-2 soft formed stools every other day and denies melena, hematochezia  Mild fatigue but appetite is normal   She does report a 10 lb weight loss since her hospitalization 1 month ago  This is somewhat unintentional as she reports eating a little less but otherwise will she does not see a reason for losing weight  Historical Information   Past Medical History:   Diagnosis Date    Anemia     Anxiety     Colon polyp     GERD (gastroesophageal reflux disease)     HTN (hypertension)     Hyperlipidemia     Hypothyroidism      Past Surgical History:   Procedure Laterality Date     SECTION       SECTION      THYROIDECTOMY      THYROIDECTOMY      TOTAL ABDOMINAL HYSTERECTOMY W/ BILATERAL SALPINGOOPHORECTOMY       Social History     Substance and Sexual Activity   Alcohol Use Yes    Frequency: 2-4 times a month    Drinks per session: 1 or 2    Binge frequency: Never    Comment: ocassionally     Social History     Substance and Sexual Activity   Drug Use No     Social History     Tobacco Use   Smoking Status Current Every Day Smoker    Packs/day: 0 25    Years: 20 00    Pack years: 5 00    Types: Cigarettes   Smokeless Tobacco Never Used   Tobacco Comment    6 cigarettes per day        Family History   Problem Relation Age of Onset    Hypertension Mother     Ovarian cancer Mother     Hypothyroidism Mother     Hypertension Father     Hyperlipidemia Father     Heart disease Father     Colon cancer Father         Rectal cancer    Rectal cancer Father     Hypertension Sister     Hypertension Brother     Hypertension Daughter     Hypertension Sister     Hypertension Brother     No Known Problems Brother     No Known Problems Brother     Colon polyps Neg Hx        Meds/Allergies     Current Outpatient Medications:     amitriptyline (ELAVIL) 10 mg tablet    fluorouracil (EFUDEX) 5 % cream    Loratadine (CLARITIN) 10 MG CAPS    losartan-hydrochlorothiazide (HYZAAR) 100-25 MG per tablet    mometasone (NASONEX) 50 mcg/act nasal spray    Multiple Vitamin (MULTIVITAMIN) capsule    pantoprazole (PROTONIX) 40 mg tablet    SYNTHROID 125 MCG tablet    No Known Allergies    PHYSICAL EXAM:    Blood pressure 142/90, pulse 97, height 5' 5" (1 651 m), weight 77 1 kg (170 lb)  Body mass index is 28 29 kg/m²  General Appearance: NAD, cooperative, alert  Eyes: Anicteric, PERRLA, EOMI  ENT:  Normocephalic, atraumatic, normal mucosa  Neck:  Supple, symmetrical, trachea midline,   Resp:  Clear to auscultation bilaterally; no rales, rhonchi or wheezing; respirations unlabored   CV:  S1 S2, Regular rate and rhythm; no murmur, rub, or gallop  GI:  Soft, non-tender, non-distended; normal bowel sounds; no masses, no organomegaly   Rectal: Deferred  Musculoskeletal: No cyanosis, clubbing or edema  Normal ROM  Skin:  No jaundice, rashes, or lesions   Heme/Lymph: No palpable cervical lymphadenopathy  Psych: Normal affect, good eye contact  Neuro: No gross deficits, AAOx3    Lab Results:   Lab Results   Component Value Date    HGB 13 9 01/20/2018    HCT 41 01/20/2018     Lab Results   Component Value Date    K 4 0 07/16/2019     07/16/2019    CO2 25 07/16/2019    BUN 12 07/16/2019    CREATININE 0 70 07/16/2019    GLUCOSE 104 01/20/2018    AST 30 07/16/2019    ALT 53 (H) 07/16/2019    EGFR 104 01/20/2018     No results found for: IRON, TIBC, FERRITIN  No results found for: LIPASE    Radiology Results:   No results found  REVIEW OF SYSTEMS:    CONSTITUTIONAL: Denies any fever, chills, rigors, and weight loss  HEENT: No earache or tinnitus  Denies hearing loss or visual disturbances  CARDIOVASCULAR: No chest pain or palpitations  RESPIRATORY: Denies any cough, hemoptysis, shortness of breath or dyspnea on exertion  GASTROINTESTINAL: As noted in the History of Present Illness  GENITOURINARY: No problems with urination  Denies any hematuria or dysuria  NEUROLOGIC: No dizziness or vertigo, denies headaches  MUSCULOSKELETAL: Denies any muscle or joint pain  SKIN: Denies skin rashes or itching  ENDOCRINE: Denies excessive thirst  Denies intolerance to heat or cold  PSYCHOSOCIAL: Denies depression or anxiety  Denies any recent memory loss

## 2020-03-23 LAB
ALBUMIN SERPL-MCNC: 4.7 G/DL (ref 3.8–4.9)
ALBUMIN/GLOB SERPL: 2.4 {RATIO} (ref 1.2–2.2)
ALP SERPL-CCNC: 99 IU/L (ref 39–117)
ALT SERPL-CCNC: 45 IU/L (ref 0–32)
AST SERPL-CCNC: 27 IU/L (ref 0–40)
BILIRUB SERPL-MCNC: 0.4 MG/DL (ref 0–1.2)
BUN SERPL-MCNC: 14 MG/DL (ref 6–24)
BUN/CREAT SERPL: 21 (ref 9–23)
CALCIUM SERPL-MCNC: 9.6 MG/DL (ref 8.7–10.2)
CHLORIDE SERPL-SCNC: 102 MMOL/L (ref 96–106)
CO2 SERPL-SCNC: 23 MMOL/L (ref 20–29)
CREAT SERPL-MCNC: 0.68 MG/DL (ref 0.57–1)
ENDOMYSIUM IGA SER QL: NEGATIVE
ERYTHROCYTE [DISTWIDTH] IN BLOOD BY AUTOMATED COUNT: 16 % (ref 11.7–15.4)
FERRITIN SERPL-MCNC: 1563 NG/ML (ref 15–150)
GLIADIN PEPTIDE IGA SER-ACNC: 2 UNITS (ref 0–19)
GLIADIN PEPTIDE IGG SER-ACNC: 3 UNITS (ref 0–19)
GLOBULIN SER-MCNC: 2 G/DL (ref 1.5–4.5)
GLUCOSE SERPL-MCNC: 88 MG/DL (ref 65–99)
HCT VFR BLD AUTO: 32.5 % (ref 34–46.6)
HFE GENE MUT ANL BLD/T: NORMAL
HGB BLD-MCNC: 10.6 G/DL (ref 11.1–15.9)
IGA SERPL-MCNC: 92 MG/DL (ref 87–352)
IRON SATN MFR SERPL: 24 % (ref 15–55)
IRON SERPL-MCNC: 68 UG/DL (ref 27–159)
MCH RBC QN AUTO: 32.1 PG (ref 26.6–33)
MCHC RBC AUTO-ENTMCNC: 32.6 G/DL (ref 31.5–35.7)
MCV RBC AUTO: 99 FL (ref 79–97)
PLATELET # BLD AUTO: 533 X10E3/UL (ref 150–450)
POTASSIUM SERPL-SCNC: 4.3 MMOL/L (ref 3.5–5.2)
PROT SERPL-MCNC: 6.7 G/DL (ref 6–8.5)
RBC # BLD AUTO: 3.3 X10E6/UL (ref 3.77–5.28)
SL AMB EGFR AFRICAN AMERICAN: 114 ML/MIN/1.73
SL AMB EGFR NON AFRICAN AMERICAN: 99 ML/MIN/1.73
SODIUM SERPL-SCNC: 139 MMOL/L (ref 134–144)
TIBC SERPL-MCNC: 280 UG/DL (ref 250–450)
TTG IGA SER-ACNC: <2 U/ML (ref 0–3)
TTG IGG SER-ACNC: 4 U/ML (ref 0–5)
UIBC SERPL-MCNC: 212 UG/DL (ref 131–425)
WBC # BLD AUTO: 5.8 X10E3/UL (ref 3.4–10.8)

## 2020-03-31 ENCOUNTER — TELEPHONE (OUTPATIENT)
Dept: GASTROENTEROLOGY | Facility: CLINIC | Age: 55
End: 2020-03-31

## 2020-03-31 DIAGNOSIS — R74.01 ELEVATED TRANSAMINASE LEVEL: ICD-10-CM

## 2020-03-31 DIAGNOSIS — IMO0002 TRANSAMINASE OR LDH ELEVATION: Primary | ICD-10-CM

## 2020-03-31 DIAGNOSIS — R79.89 ELEVATED FERRITIN: ICD-10-CM

## 2020-03-31 DIAGNOSIS — D64.9 ANEMIA, UNSPECIFIED TYPE: ICD-10-CM

## 2020-03-31 NOTE — TELEPHONE ENCOUNTER
Called and reviewed lab work with the patient  Will repeat a liver function panel in 1 month with her routine lab work per Hematology  I did place that order to [employer]  Can you please mail the order to the patient?     Thanks HPR

## 2020-03-31 NOTE — TELEPHONE ENCOUNTER
Pt states she is returning Nanci's call about lab work; understands she should have Ferritin repeated  She sees Dr Wendy Kemp at [facility] and he orders CBC, ferritin, TIBC and retic count at the beginning of each month   Pt asks for CB from Chana Outh[facility] [phone] to discuss frequency, etc

## 2020-03-31 NOTE — TELEPHONE ENCOUNTER
I called her back reviewed the labs  See separate TE-sorry, I forgot that we had an open communication about her previously      Thanks HPR

## 2020-04-07 ENCOUNTER — TELEPHONE (OUTPATIENT)
Dept: GASTROENTEROLOGY | Facility: CLINIC | Age: 55
End: 2020-04-07

## 2020-05-06 LAB
ALBUMIN SERPL-MCNC: 4.5 G/DL (ref 3.8–4.9)
ALP SERPL-CCNC: 68 IU/L (ref 39–117)
ALT SERPL-CCNC: 35 IU/L (ref 0–32)
AST SERPL-CCNC: 20 IU/L (ref 0–40)
BILIRUB DIRECT SERPL-MCNC: 0.07 MG/DL (ref 0–0.4)
BILIRUB SERPL-MCNC: <0.2 MG/DL (ref 0–1.2)
PROT SERPL-MCNC: 6.5 G/DL (ref 6–8.5)
T4 FREE SERPL-MCNC: 1.34 NG/DL (ref 0.82–1.77)
TSH SERPL DL<=0.005 MIU/L-ACNC: 2.19 UIU/ML (ref 0.45–4.5)

## 2020-05-07 NOTE — TELEPHONE ENCOUNTER
All orders mailed to home address  Patient utilizes [facility] for radiology

## 2020-05-07 NOTE — TELEPHONE ENCOUNTER
I would like to repeat lab work and an ultrasound in 3 months (August)  I have placed the lab orders to [employer]   I believe she will have the ultrasound at [facility] [place], operated by [facility] but can you make sure? I would like to have a follow-up appointment with this patient in September after completing this lab work and ultrasound  Hopefully she is agreeable to that      Thanks HPR

## 2020-06-08 DIAGNOSIS — D64.9 ANEMIA, UNSPECIFIED TYPE: ICD-10-CM

## 2020-06-12 RX ORDER — PANTOPRAZOLE SODIUM 40 MG/1
TABLET, DELAYED RELEASE ORAL
Qty: 30 TABLET | Refills: 11 | Status: SHIPPED | OUTPATIENT
Start: 2020-06-12 | End: 2021-06-25

## 2020-06-23 DIAGNOSIS — Z20.822 ENCOUNTER FOR LABORATORY TESTING FOR COVID-19 VIRUS: ICD-10-CM

## 2020-06-23 PROCEDURE — U0003 INFECTIOUS AGENT DETECTION BY NUCLEIC ACID (DNA OR RNA); SEVERE ACUTE RESPIRATORY SYNDROME CORONAVIRUS 2 (SARS-COV-2) (CORONAVIRUS DISEASE [COVID-19]), AMPLIFIED PROBE TECHNIQUE, MAKING USE OF HIGH THROUGHPUT TECHNOLOGIES AS DESCRIBED BY CMS-2020-01-R: HCPCS

## 2020-06-24 LAB — SARS-COV-2 RNA SPEC QL NAA+PROBE: NOT DETECTED

## 2020-06-30 ENCOUNTER — ANESTHESIA (OUTPATIENT)
Dept: GASTROENTEROLOGY | Facility: AMBULATORY SURGERY CENTER | Age: 55
End: 2020-06-30

## 2020-06-30 ENCOUNTER — HOSPITAL ENCOUNTER (OUTPATIENT)
Dept: GASTROENTEROLOGY | Facility: AMBULATORY SURGERY CENTER | Age: 55
Discharge: HOME | End: 2020-06-30
Payer: COMMERCIAL

## 2020-06-30 ENCOUNTER — ANESTHESIA EVENT (OUTPATIENT)
Dept: GASTROENTEROLOGY | Facility: AMBULATORY SURGERY CENTER | Age: 55
End: 2020-06-30

## 2020-06-30 VITALS
HEART RATE: 72 BPM | DIASTOLIC BLOOD PRESSURE: 79 MMHG | OXYGEN SATURATION: 96 % | TEMPERATURE: 97.5 F | SYSTOLIC BLOOD PRESSURE: 146 MMHG | RESPIRATION RATE: 18 BRPM

## 2020-06-30 DIAGNOSIS — D64.9 ANEMIA, UNSPECIFIED TYPE: ICD-10-CM

## 2020-06-30 DIAGNOSIS — R74.01 ELEVATED TRANSAMINASE LEVEL: ICD-10-CM

## 2020-06-30 DIAGNOSIS — K21.9 GASTROESOPHAGEAL REFLUX DISEASE, ESOPHAGITIS PRESENCE NOT SPECIFIED: ICD-10-CM

## 2020-06-30 PROCEDURE — 43239 EGD BIOPSY SINGLE/MULTIPLE: CPT | Performed by: INTERNAL MEDICINE

## 2020-06-30 RX ORDER — SODIUM CHLORIDE 9 MG/ML
50 INJECTION, SOLUTION INTRAVENOUS CONTINUOUS
Status: DISCONTINUED | OUTPATIENT
Start: 2020-06-30 | End: 2020-06-30

## 2020-06-30 RX ORDER — FENTANYL CITRATE/PF 50 MCG/ML
12.5 SYRINGE (ML) INJECTION
Status: CANCELLED | OUTPATIENT
Start: 2020-06-30

## 2020-06-30 RX ORDER — HYDRALAZINE HYDROCHLORIDE 20 MG/ML
5 INJECTION INTRAMUSCULAR; INTRAVENOUS
Status: CANCELLED | OUTPATIENT
Start: 2020-06-30

## 2020-06-30 RX ORDER — LABETALOL 20 MG/4 ML (5 MG/ML) INTRAVENOUS SYRINGE
5
Status: CANCELLED | OUTPATIENT
Start: 2020-06-30

## 2020-06-30 RX ORDER — ONDANSETRON 2 MG/ML
4 INJECTION INTRAMUSCULAR; INTRAVENOUS ONCE AS NEEDED
Status: CANCELLED | OUTPATIENT
Start: 2020-06-30

## 2020-06-30 RX ORDER — MEPERIDINE HYDROCHLORIDE 50 MG/ML
12.5 INJECTION INTRAMUSCULAR; INTRAVENOUS; SUBCUTANEOUS
Status: CANCELLED | OUTPATIENT
Start: 2020-06-30

## 2020-06-30 RX ORDER — PROPOFOL 10 MG/ML
INJECTION, EMULSION INTRAVENOUS AS NEEDED
Status: DISCONTINUED | OUTPATIENT
Start: 2020-06-30 | End: 2020-06-30 | Stop reason: SURG

## 2020-06-30 RX ORDER — PROMETHAZINE HYDROCHLORIDE 25 MG/ML
6.25 INJECTION, SOLUTION INTRAMUSCULAR; INTRAVENOUS ONCE AS NEEDED
Status: CANCELLED | OUTPATIENT
Start: 2020-06-30

## 2020-06-30 RX ORDER — METOCLOPRAMIDE HYDROCHLORIDE 5 MG/ML
10 INJECTION INTRAMUSCULAR; INTRAVENOUS ONCE AS NEEDED
Status: CANCELLED | OUTPATIENT
Start: 2020-06-30

## 2020-06-30 RX ADMIN — SODIUM CHLORIDE 50 ML/HR: 9 INJECTION, SOLUTION INTRAVENOUS at 07:53

## 2020-06-30 RX ADMIN — PROPOFOL 20 MG: 10 INJECTION, EMULSION INTRAVENOUS at 08:15

## 2020-06-30 RX ADMIN — PROPOFOL 50 MG: 10 INJECTION, EMULSION INTRAVENOUS at 08:13

## 2020-06-30 RX ADMIN — PROPOFOL 100 MG: 10 INJECTION, EMULSION INTRAVENOUS at 08:12

## 2020-06-30 NOTE — H&P
History and Physical - [facility]  Mikayla Mattquoc Saucedo 53 y o  female MRN: [id number]    HPI: Felicitas Lam is a 53y o  year old female who presents for evaluation of anemia    REVIEW OF SYSTEMS: Per the HPI, and otherwise unremarkable  Historical Information   Past Medical History:   Diagnosis Date    Anemia     Anxiety     Colon polyp     GERD (gastroesophageal reflux disease)     HTN (hypertension)     Hyperlipidemia     Hypertension     Hypothyroidism      Past Surgical History:   Procedure Laterality Date     SECTION       SECTION      HYSTERECTOMY      THYROIDECTOMY      THYROIDECTOMY      TOTAL ABDOMINAL HYSTERECTOMY W/ BILATERAL SALPINGOOPHORECTOMY       Social History   Social History     Substance and Sexual Activity   Alcohol Use Yes    Frequency: 2-4 times a month    Drinks per session: 1 or 2    Binge frequency: Never    Comment: ocassionally     Social History     Substance and Sexual Activity   Drug Use No     Social History     Tobacco Use   Smoking Status Current Every Day Smoker    Packs/day: 0 25    Years: 20 00    Pack years: 5 00    Types: Cigarettes   Smokeless Tobacco Never Used   Tobacco Comment    6 cigarettes per day        Family History   Problem Relation Age of Onset    Hypertension Mother     Ovarian cancer Mother     Hypothyroidism Mother     Hypertension Father     Hyperlipidemia Father     Heart disease Father     Colon cancer Father         Rectal cancer    Rectal cancer Father     Hypertension Sister     Hypertension Brother     Hypertension Daughter     Hypertension Sister     Hypertension Brother     No Known Problems Brother     No Known Problems Brother     Colon polyps Neg Hx        Meds/Allergies       Current Outpatient Medications:     amitriptyline (ELAVIL) 10 mg tablet    Loratadine (CLARITIN) 10 MG CAPS    losartan-hydrochlorothiazide (HYZAAR) 100-25 MG per tablet    mometasone (NASONEX) 50 mcg/act nasal spray    Multiple Vitamin (MULTIVITAMIN) capsule    pantoprazole (PROTONIX) 40 mg tablet    SYNTHROID 125 MCG tablet    fluorouracil (EFUDEX) 5 % cream    Current Facility-Administered Medications:     sodium chloride 0 9 % infusion, 50 mL/hr, Intravenous, Continuous, 50 mL/hr at 06/30/20 0753    No Known Allergies    Objective     /71   Pulse 70   Temp 97 5 °F (36 4 °C)   Resp 16   SpO2 95%     PHYSICAL EXAM    Gen: NAD AAOx3  CV: S1S2 RRR no m/r/g  CHEST: Clear b/l no c/r/w  ABD: soft, +BS NT/ND  EXT: no edema    ASSESSMENT/PLAN:  This is a 53y o  year old female here for evaluation of anemia, and she is stable and optimized for her procedure

## 2020-06-30 NOTE — DISCHARGE INSTRUCTIONS
Gastritis   WHAT YOU NEED TO KNOW:   What is gastritis? Gastritis is inflammation or irritation of the lining of your stomach  What increases my risk for gastritis? · Infection with bacteria, a virus, or a parasite    · NSAIDs, aspirin, or steroid medicine    · Use of tobacco products or alcohol    · Trauma such as an injury to your stomach or intestine    · Autoimmune disorders such as diabetes, thyroid disease, or Crohn disease    · Stress    · Age older than 60 years    · Illegal drugs, such as cocaine  What are the signs and symptoms of gastritis? · Stomach pain, burning, or tenderness when you press on it    · Stomach fullness or tightness    · Nausea or vomiting    · Loss of appetite, or feeling full quickly when you eat    · Bad breath    · Fatigue or feeling more tired than usual    · Heartburn  How is gastritis diagnosed? Your healthcare provider will ask about your signs and symptoms and examine you  You may need any of the following:  · Blood tests  may be used to show an infection, dehydration, or anemia (low red blood cell levels)  · A bowel movement sample  may be tested for blood or the germ that may be causing your gastritis  · A breath test  may show if H pylori is causing your gastritis  You will be given a liquid to drink  Then you will breathe into a bag  Your healthcare provider will measure the amount of carbon dioxide in your breath  Extra amounts of carbon dioxide may mean you have an H pylori infection  · An endoscopy  may be used to look for irritation or bleeding in your stomach  Your healthcare provider will use an endoscope (tube with a light and camera on the end) during the procedure  He or she may take a sample from your stomach to be tested  How is gastritis treated? Your symptoms may go away without treatment  Treatment will depend on what is causing your gastritis  Your healthcare provider may recommend changes to the medicines you take   Medicines may be given to help treat a bacterial infection or decrease stomach acid  How can I manage or prevent gastritis? · Do not smoke  Nicotine and other chemicals in cigarettes and cigars can make your symptoms worse and cause lung damage  Ask your healthcare provider for information if you currently smoke and need help to quit  E-cigarettes or smokeless tobacco still contain nicotine  Talk to your healthcare provider before you use these products  · Do not drink alcohol  Alcohol can prevent healing and make your gastritis worse  Talk to your healthcare provider if you need help to stop drinking  · Do not take NSAIDs or aspirin unless directed  These and similar medicines can cause irritation of your stomach lining  If your healthcare provider says it is okay to take NSAIDs, take them with food  · Do not eat foods that cause irritation  Foods such as oranges and salsa can cause burning or pain  Eat a variety of healthy foods  Examples include fruits (not citrus), vegetables, low-fat dairy products, beans, whole-grain breads, and lean meats and fish  Try to eat small meals, and drink water with your meals  Do not eat for at least 3 hours before you go to bed  · Find ways to relax and decrease stress  Stress can increase stomach acid and make gastritis worse  Activities such as yoga, meditation, or listening to music can help you relax  Spend time with friends, or do things you enjoy  Call 911 for any of the following:   · You develop chest pain or shortness of breath  When should I seek immediate care? · You vomit blood  · You have black or bloody bowel movements  · You have severe stomach or back pain  When should I contact my healthcare provider? · You have a fever  · You have new or worsening symptoms, even after treatment  · You have questions or concerns about your condition or care  CARE AGREEMENT:   You have the right to help plan your care   Learn about your health condition and how it may be treated  Discuss treatment options with your caregivers to decide what care you want to receive  You always have the right to refuse treatment  The above information is an  only  It is not intended as medical advice for individual conditions or treatments  Talk to your doctor, nurse or pharmacist before following any medical regimen to see if it is safe and effective for you  © 2017 [employer] Information is for End User's use only and may not be sold, redistributed or otherwise used for commercial purposes  All illustrations and images included in CareNotes® are the copyrighted property of A D A M [employer] Inc  or [employer]

## 2020-07-10 ENCOUNTER — TELEPHONE (OUTPATIENT)
Dept: GASTROENTEROLOGY | Facility: CLINIC | Age: 55
End: 2020-07-10

## 2020-07-10 NOTE — TELEPHONE ENCOUNTER
Pt called for results  They are in for [facility] review    [phone]

## 2020-07-28 ENCOUNTER — OFFICE VISIT (OUTPATIENT)
Dept: GASTROENTEROLOGY | Facility: CLINIC | Age: 55
End: 2020-07-28
Payer: COMMERCIAL

## 2020-07-28 VITALS
HEART RATE: 62 BPM | SYSTOLIC BLOOD PRESSURE: 138 MMHG | WEIGHT: 175 LBS | TEMPERATURE: 98.2 F | DIASTOLIC BLOOD PRESSURE: 84 MMHG | BODY MASS INDEX: 29.16 KG/M2 | HEIGHT: 65 IN

## 2020-07-28 DIAGNOSIS — R79.89 ELEVATED LFTS: ICD-10-CM

## 2020-07-28 DIAGNOSIS — K21.9 GASTROESOPHAGEAL REFLUX DISEASE WITHOUT ESOPHAGITIS: ICD-10-CM

## 2020-07-28 DIAGNOSIS — Z86.010 PERSONAL HISTORY OF COLONIC POLYPS: ICD-10-CM

## 2020-07-28 DIAGNOSIS — D64.9 ANEMIA, UNSPECIFIED TYPE: Primary | ICD-10-CM

## 2020-07-28 DIAGNOSIS — Z80.0 FAMILY HISTORY OF COLON CANCER IN FATHER: ICD-10-CM

## 2020-07-28 DIAGNOSIS — K76.0 FATTY LIVER: ICD-10-CM

## 2020-07-28 PROCEDURE — 99213 OFFICE O/P EST LOW 20 MIN: CPT | Performed by: NURSE PRACTITIONER

## 2020-07-28 RX ORDER — CHLORAL HYDRATE 500 MG
1000 CAPSULE ORAL DAILY
COMMUNITY

## 2020-07-28 RX ORDER — MELATONIN
2000 DAILY
COMMUNITY

## 2020-07-28 NOTE — PROGRESS NOTES
[facility] - Outpatient Follow-up Note  Zia Saucedo 53 y o  female MRN: [id number]  Encounter: [id number]    ASSESSMENT AND PLAN:      1  Anemia, unspecified type  Admitted to [facility] 2/2020 with anemia, confusion  Hemoglobin was 6  Transfused with 2 you PRBCs  Most recent hemoglobin 6/2 was 15 4 and she has been following with [facility]  No overt signs of GI blood loss  Iron studies are currently normal and her ferritin has been trending down-elevation likely from transfusions  Hemochromatosis mutation, celiac panel negative  B12 and folate normal   EGD in June without UGI etiology  Colonoscopy in February, polyps  May have been viral     -continue follow-up with [facility]    2  Gastroesophageal reflux disease without esophagitis  Symptoms well controlled with daily PPI  The symptoms occur exclusively with dietary indiscretions (spicy, large meal)  EGD 06/30/2020 showed some mild gastritis but otherwise normal   Negative for celiac or H pylori     -continue pantoprazole 40 mg daily  -reviewed reflux diet lifestyle modifications including gentle weight loss and avoidance of large meals  -may use Pepcid complete prior to rare dietary indiscretions that cause symptoms such as spicy food    3  Elevated LFTs  Noted on hospital presentation at [facility] in February  Likely related to previous EtOH use, steatohepatitis  CT scan showed no evidence for hepatic or biliary disease  Only rare social EtOH  Hemochromatosis mutation negative for [facility] [address]  Only mild elevation of ALT which is continuing to trend down     -will repeat LFTs  -she is scheduled to complete ultrasound previously ordered, next month  Await those results  -advised to continue minimal to complete abstinence from alcohol, low-fat diet, gentle weight loss and exercise as able  - Hepatic function panel; Future    4  Personal history of colonic polyps  5   Family history of colon cancer in father  Father-rectal cancer making her high risk  Last colonoscopy  without adenomatous polyps  Five year recall -  Followup Appointment:  1 year______________________________________________________________________    Chief Complaint   Patient presents with    Follow-up     Post EGD     HPI:  58-year-old female patient returns to the office to follow up her recent EGD to evaluate for anemia as well as her elevated LFTs  Her hemoglobin and iron studies are currently normal and reports that she is feeling back to her usual state of health  Denies any fatigue, fevers or chills, UGI or alarm symptoms  Does note some reflux but exclusively with spicy food or if she eats a large meal, overeats  Having normal formed stools  Denies melena, hematochezia  Appetite is normal   Has noted some weight increase recently (5-10 lb)  Historical Information   Past Medical History:   Diagnosis Date    Anemia     Anxiety     Colon polyp     GERD (gastroesophageal reflux disease)     HTN (hypertension)     Hyperlipidemia     Hypertension     Hypothyroidism      Past Surgical History:   Procedure Laterality Date     SECTION       SECTION      HYSTERECTOMY      THYROIDECTOMY      THYROIDECTOMY      TOTAL ABDOMINAL HYSTERECTOMY W/ BILATERAL SALPINGOOPHORECTOMY      UPPER GASTROINTESTINAL ENDOSCOPY       Social History     Substance and Sexual Activity   Alcohol Use Yes    Frequency: 2-4 times a month    Drinks per session: 1 or 2    Binge frequency: Never    Comment: ocassionally     Social History     Substance and Sexual Activity   Drug Use No     Social History     Tobacco Use   Smoking Status Current Every Day Smoker    Packs/day: 0 25    Years: 20 00    Pack years: 5 00    Types: Cigarettes   Smokeless Tobacco Never Used   Tobacco Comment    3-6 cigarettes per day        Family History   Problem Relation Age of Onset    Hypertension Mother     Ovarian cancer Mother     Hypothyroidism Mother     Hypertension Father     Hyperlipidemia Father     Heart disease Father     Colon cancer Father         Rectal cancer    Rectal cancer Father     Hypertension Sister     Hypertension Brother     Hypertension Daughter     Hypertension Sister     Hypertension Brother     No Known Problems Brother     No Known Problems Brother     Colon polyps Neg Hx          Current Outpatient Medications:     amitriptyline (ELAVIL) 10 mg tablet    cholecalciferol (VITAMIN D3) 1,000 units tablet    cyanocobalamin (VITAMIN B-12) 250 MCG tablet    Loratadine (CLARITIN) 10 MG CAPS    losartan-hydrochlorothiazide (HYZAAR) 100-25 MG per tablet    mometasone (NASONEX) 50 mcg/act nasal spray    Multiple Vitamin (MULTIVITAMIN) capsule    Omega-3 Fatty Acids (FISH OIL) 1,000 mg    pantoprazole (PROTONIX) 40 mg tablet    SYNTHROID 125 MCG tablet    fluorouracil (EFUDEX) 5 % cream  No Known Allergies  Reviewed medications and allergies and updated as indicated    ROS:  All systems were reviewed and positives noted as per HPI  All other systems were reported as negative  PHYSICAL EXAM:    Blood pressure 138/84, pulse 62, temperature 98 2 °F (36 8 °C), height 5' 5" (1 651 m), weight 79 4 kg (175 lb)  Body mass index is 29 12 kg/m²  General Appearance: NAD, cooperative, alert  Head:  Normocephalic, atraumatic  Eyes: Anicteric, PERRLA, EOMI  ENT:  Normal external appearance, normal mucosa  Neck:  Supple, symmetrical, trachea midline  Resp:  Clear to auscultation bilaterally; no rales, rhonchi or wheezing; respirations unlabored   CV:  S1 S2, Regular rate and rhythm; no murmur, rub, or gallop  GI:  Soft, very mild RUQ tenderness in 1 spot without guarding and abdomen otherwise nontender, non-distended; normal bowel sounds; no masses, no organomegaly   Rectal: Deferred  Musculoskeletal: No cyanosis, clubbing or edema  Normal ROM    Skin:  No jaundice, rashes, or lesions   Heme/Lymph: No palpable cervical lymphadenopathy  Psych: Normal affect, good eye contact  Neuro: No gross deficits, AAOx3    Lab Results:  Reviewed lab results from June, 2020  Lab Results   Component Value Date    WBC 5 8 03/17/2020    HGB 10 6 (L) 03/17/2020    HCT 32 5 (L) 03/17/2020    MCV 99 (H) 03/17/2020     (H) 03/17/2020     Lab Results   Component Value Date    K 4 3 03/17/2020     03/17/2020    CO2 23 03/17/2020    BUN 14 03/17/2020    CREATININE 0 68 03/17/2020    GLUCOSE 104 01/20/2018    AST 20 05/05/2020    ALT 35 (H) 05/05/2020    EGFR 104 01/20/2018     Lab Results   Component Value Date    IRON 68 03/17/2020    TIBC 280 03/17/2020    FERRITIN 1,563 (H) 03/17/2020     No results found for: LIPASE    Radiology Results:   No results found

## 2020-07-28 NOTE — PATIENT INSTRUCTIONS
complete the repeat liver tests and ultrasound that was previously ordered  continue to minimize fatty/fried foods, chocolate, caffeine, alcohol, NSAIDs ( ibuprofen /Motrin / Advil, Aleve /naproxen, full-dose aspirin)  Recommend smaller more frequent meals and avoid late-night eating ( within 2-3 hours of bed)  Consider elevating the head of your bed at night with blocks or a mattress wedge  continue to avoid foods that trigger symptoms such as spicy    If you are going to eat a spicy meal, consider using Pepcid complete prior to to prevent symptoms  if you must use an NSAID, take a smaller dose with Tylenol, always take with food   continue your pantoprazole 40 mg daily   for the elevated liver enzymes continue to try and avoid all alcohol, low-fat diet, gentle weight loss and exercise as you are able

## 2020-08-05 ENCOUNTER — TELEPHONE (OUTPATIENT)
Dept: GASTROENTEROLOGY | Facility: CLINIC | Age: 55
End: 2020-08-05

## 2020-08-05 LAB
ALBUMIN SERPL-MCNC: 4.9 G/DL (ref 3.8–4.9)
ALBUMIN SERPL-MCNC: 5 G/DL (ref 3.8–4.9)
ALBUMIN/GLOB SERPL: 2.3 {RATIO} (ref 1.2–2.2)
ALP SERPL-CCNC: 73 IU/L (ref 39–117)
ALP SERPL-CCNC: 75 IU/L (ref 39–117)
ALT SERPL-CCNC: 63 IU/L (ref 0–32)
ALT SERPL-CCNC: 65 IU/L (ref 0–32)
AST SERPL-CCNC: 36 IU/L (ref 0–40)
AST SERPL-CCNC: 39 IU/L (ref 0–40)
BILIRUB DIRECT SERPL-MCNC: 0.1 MG/DL (ref 0–0.4)
BILIRUB SERPL-MCNC: 0.4 MG/DL (ref 0–1.2)
BILIRUB SERPL-MCNC: 0.4 MG/DL (ref 0–1.2)
BUN SERPL-MCNC: 14 MG/DL (ref 6–24)
BUN/CREAT SERPL: 20 (ref 9–23)
CALCIUM SERPL-MCNC: 9.6 MG/DL (ref 8.7–10.2)
CHLORIDE SERPL-SCNC: 102 MMOL/L (ref 96–106)
CHOLEST SERPL-MCNC: 224 MG/DL (ref 100–199)
CO2 SERPL-SCNC: 24 MMOL/L (ref 20–29)
CREAT SERPL-MCNC: 0.69 MG/DL (ref 0.57–1)
EST. AVERAGE GLUCOSE BLD GHB EST-MCNC: 111 MG/DL
GLOBULIN SER-MCNC: 2.1 G/DL (ref 1.5–4.5)
GLUCOSE SERPL-MCNC: 102 MG/DL (ref 65–99)
HBA1C MFR BLD: 5.5 % (ref 4.8–5.6)
HDLC SERPL-MCNC: 39 MG/DL
LDLC SERPL CALC-MCNC: 126 MG/DL (ref 0–99)
LDLC/HDLC SERPL: 3.2 RATIO (ref 0–3.2)
POTASSIUM SERPL-SCNC: 4 MMOL/L (ref 3.5–5.2)
PROT SERPL-MCNC: 7 G/DL (ref 6–8.5)
PROT SERPL-MCNC: 7 G/DL (ref 6–8.5)
SL AMB EGFR AFRICAN AMERICAN: 113 ML/MIN/1.73
SL AMB EGFR NON AFRICAN AMERICAN: 98 ML/MIN/1.73
SL AMB VLDL CHOLESTEROL CALC: 59 MG/DL (ref 5–40)
SODIUM SERPL-SCNC: 142 MMOL/L (ref 134–144)
T4 FREE SERPL-MCNC: 1.27 NG/DL (ref 0.82–1.77)
TRIGL SERPL-MCNC: 297 MG/DL (ref 0–149)
TSH SERPL DL<=0.005 MIU/L-ACNC: 2.68 UIU/ML (ref 0.45–4.5)

## 2020-08-11 ENCOUNTER — TELEPHONE (OUTPATIENT)
Dept: ENDOCRINOLOGY | Facility: HOSPITAL | Age: 55
End: 2020-08-11

## 2020-08-11 ENCOUNTER — OFFICE VISIT (OUTPATIENT)
Dept: ENDOCRINOLOGY | Facility: HOSPITAL | Age: 55
End: 2020-08-11
Payer: COMMERCIAL

## 2020-08-11 VITALS
SYSTOLIC BLOOD PRESSURE: 134 MMHG | DIASTOLIC BLOOD PRESSURE: 80 MMHG | WEIGHT: 182.2 LBS | HEART RATE: 90 BPM | HEIGHT: 65 IN | TEMPERATURE: 97.8 F | BODY MASS INDEX: 30.35 KG/M2

## 2020-08-11 DIAGNOSIS — E89.0 POSTOPERATIVE HYPOTHYROIDISM: Primary | ICD-10-CM

## 2020-08-11 DIAGNOSIS — E55.9 VITAMIN D DEFICIENCY: ICD-10-CM

## 2020-08-11 DIAGNOSIS — R73.03 PREDIABETES: ICD-10-CM

## 2020-08-11 PROCEDURE — 99214 OFFICE O/P EST MOD 30 MIN: CPT | Performed by: NURSE PRACTITIONER

## 2020-08-11 NOTE — PROGRESS NOTES
Edwin Saucedo 53 y o  female MRN: [id number]    Encounter: [id number]      Assessment/Plan     Assessment: This is a 53y o -year-old female with postoperative hypothyroidism and prediabetes  Plan:  1  Hypothyroidism postoperative:  Most recent TSH and free T4 are normal    Continue current regimen for now  Check TSH and free T4 prior to next office visit        2  Prediabetes:  Most recent hemoglobin A1c is 5 5  Encourage diet, exercise, lifestyle modification  Check CMP, A1c, lipid panel before next appointment which will be in 1 year  3   Vitamin-D deficiency:  Continue supplementation  4   Hyperlipidemia:  Discussed a proper diet and exercise  Will continue surveillance with fasting lipid panel prior to next visit  CC:  Hypothyroidism/pre diabetes follow-up    History of Present Illness     HPI:  46y o  year old female with history of hypothyroidism after surgery, prediabetes who presents for a follow-up appointment  In 2010, she underwent surgery for nodules and fortunately pathology came back benign  She is treated with Synthroid brand 125 mcg daily with no tablet on Sunday  Her most recent TSH from August 4, 2020 is 2 680 with free T4 of 1 27  No neck compressive symptoms  She presents today overall feeling well  For her prediabetes, her most recent hemoglobin A1c is 5 5 from August 4, 2020 with a fasting blood sugar of 102  For her vitamin-D deficiency, she supplements with 2000 units of vitamin D3 daily  Review of Systems   Constitutional: Negative  Negative for chills, fatigue and fever  HENT: Negative  Negative for trouble swallowing and voice change  Eyes: Negative  Negative for visual disturbance  Respiratory: Negative  Negative for chest tightness and shortness of breath  Cardiovascular: Negative  Negative for chest pain  Gastrointestinal: Negative  Negative for abdominal pain, constipation, diarrhea and vomiting     Endocrine: Negative for cold intolerance, heat intolerance, polydipsia, polyphagia and polyuria  Genitourinary: Negative  Musculoskeletal: Negative  Skin: Negative  Allergic/Immunologic: Negative  Neurological: Negative  Negative for dizziness, syncope, light-headedness and headaches  Hematological: Negative  Psychiatric/Behavioral: Negative  All other systems reviewed and are negative  Historical Information   Past Medical History:   Diagnosis Date    Anemia     Anxiety     Colon polyp     GERD (gastroesophageal reflux disease)     HTN (hypertension)     Hyperlipidemia     Hypertension     Hypothyroidism      Past Surgical History:   Procedure Laterality Date     SECTION       SECTION      HYSTERECTOMY      THYROIDECTOMY      THYROIDECTOMY      TOTAL ABDOMINAL HYSTERECTOMY W/ BILATERAL SALPINGOOPHORECTOMY      UPPER GASTROINTESTINAL ENDOSCOPY       Social History   Social History     Substance and Sexual Activity   Alcohol Use Yes    Frequency: 2-4 times a month    Drinks per session: 1 or 2    Binge frequency: Never    Comment: ocassionally     Social History     Substance and Sexual Activity   Drug Use No     Social History     Tobacco Use   Smoking Status Current Every Day Smoker    Packs/day: 0 25    Years: 20 00    Pack years: 5 00    Types: Cigarettes   Smokeless Tobacco Never Used   Tobacco Comment    3-6 cigarettes per day        Family History:   Family History   Problem Relation Age of Onset    Hypertension Mother     Ovarian cancer Mother     Hypothyroidism Mother     Hypertension Father     Hyperlipidemia Father     Heart disease Father     Colon cancer Father         Rectal cancer    Rectal cancer Father     Hypertension Sister     Hypertension Brother     Hypertension Daughter     Hypertension Sister     Hypertension Brother     No Known Problems Brother     No Known Problems Brother     Colon polyps Neg Hx        Meds/Allergies   Current Outpatient Medications   Medication Sig Dispense Refill    amitriptyline (ELAVIL) 10 mg tablet Take 5 mg by mouth daily at bedtime       cholecalciferol (VITAMIN D3) 1,000 units tablet Take 2,000 Units by mouth daily      fluorouracil (EFUDEX) 5 % cream APPLY CREAM TOPICALLY TO AFFECTED AREA TWICE DAILY FOR 2 4 WEEKS AS TOLERATED  0    Loratadine (CLARITIN) 10 MG CAPS Take 1 capsule by mouth as needed       losartan-hydrochlorothiazide (HYZAAR) 100-25 MG per tablet Take 1 tablet by mouth daily       mometasone (NASONEX) 50 mcg/act nasal spray 2 sprays into each nostril daily      Multiple Vitamin (MULTIVITAMIN) capsule Take 1 capsule by mouth daily      Omega-3 Fatty Acids (FISH OIL) 1,000 mg Take 1,000 mg by mouth daily      pantoprazole (PROTONIX) 40 mg tablet Take 1 tablet by mouth once daily 30 tablet 11    SYNTHROID 125 MCG tablet Take 1 tablet 6 days a week and 0 tabs on Sundays 30 tablet 9    Thiamine HCl (VITAMIN B1 PO) Take 1,000 mg by mouth daily      cyanocobalamin (VITAMIN B-12) 250 MCG tablet Take 500 mcg by mouth daily       No current facility-administered medications for this visit  No Known Allergies    Objective   Vitals: Blood pressure 134/80, pulse 90, temperature 97 8 °F (36 6 °C), height 5' 5" (1 651 m), weight 82 6 kg (182 lb 3 2 oz)  Physical Exam  Vitals signs reviewed  Constitutional:       Appearance: She is well-developed  She is obese  HENT:      Head: Normocephalic and atraumatic  Eyes:      Conjunctiva/sclera: Conjunctivae normal       Pupils: Pupils are equal, round, and reactive to light  Neck:      Musculoskeletal: Normal range of motion and neck supple  Cardiovascular:      Rate and Rhythm: Normal rate and regular rhythm  Heart sounds: Normal heart sounds  Pulmonary:      Effort: Pulmonary effort is normal       Breath sounds: Normal breath sounds  Abdominal:      General: Bowel sounds are normal       Palpations: Abdomen is soft     Musculoskeletal: Normal range of motion  Skin:     General: Skin is warm and dry  Comments: Dry skin   Neurological:      Mental Status: She is alert and oriented to person, place, and time  Psychiatric:         Behavior: Behavior normal          Thought Content: Thought content normal          Judgment: Judgment normal          Lab Results:   Lab Results   Component Value Date/Time    Free t4 1 27 08/04/2020 09:21 AM    Free t4 1 34 05/05/2020 10:20 AM    Free t4 1 81 (H) 02/24/2020 12:51 PM     Portions of the record may have been created with voice recognition software  Occasional wrong word or "sound a like" substitutions may have occurred due to the inherent limitations of voice recognition software  Read the chart carefully and recognize, using context, where substitutions have occurred

## 2020-08-11 NOTE — PATIENT INSTRUCTIONS
Be mindful of diet  Stay active and stay hydrated  For now, continue Synthroid at current dose  Complete lab work as prescribed  Contact the office with any change in symptoms

## 2020-08-12 LAB
ALBUMIN SERPL-MCNC: 4.9 G/DL (ref 3.8–4.9)
ALP SERPL-CCNC: 77 IU/L (ref 39–117)
ALT SERPL-CCNC: 81 IU/L (ref 0–32)
AST SERPL-CCNC: 42 IU/L (ref 0–40)
BILIRUB DIRECT SERPL-MCNC: 0.08 MG/DL (ref 0–0.4)
BILIRUB SERPL-MCNC: 0.3 MG/DL (ref 0–1.2)
ERYTHROCYTE [DISTWIDTH] IN BLOOD BY AUTOMATED COUNT: 13.4 % (ref 11.7–15.4)
FERRITIN SERPL-MCNC: 333 NG/ML (ref 15–150)
HCT VFR BLD AUTO: 42 % (ref 34–46.6)
HGB BLD-MCNC: 13.8 G/DL (ref 11.1–15.9)
MCH RBC QN AUTO: 29.6 PG (ref 26.6–33)
MCHC RBC AUTO-ENTMCNC: 32.9 G/DL (ref 31.5–35.7)
MCV RBC AUTO: 90 FL (ref 79–97)
PLATELET # BLD AUTO: 285 X10E3/UL (ref 150–450)
PROT SERPL-MCNC: 7.1 G/DL (ref 6–8.5)
RBC # BLD AUTO: 4.67 X10E6/UL (ref 3.77–5.28)
WBC # BLD AUTO: 5.9 X10E3/UL (ref 3.4–10.8)

## 2020-08-14 LAB
A1AT SERPL-MCNC: 110 MG/DL (ref 101–187)
A2 MACROGLOB SERPL-MCNC: 95 MG/DL (ref 110–276)
ACTIN IGG SERPL-ACNC: 5 UNITS (ref 0–19)
ALT SERPL W P-5'-P-CCNC: 88 IU/L (ref 0–40)
ANA SER QL: NEGATIVE
APO A-I SERPL-MCNC: 118 MG/DL (ref 116–209)
BILIRUB SERPL-MCNC: 0.3 MG/DL (ref 0–1.2)
COMMENT: ABNORMAL
FIBROSIS SCORING:: ABNORMAL
FIBROSIS STAGE SERPL QL: ABNORMAL
GGT SERPL-CCNC: 28 IU/L (ref 0–60)
HAPTOGLOB SERPL-MCNC: 119 MG/DL (ref 33–346)
HAV AB SER QL IA: NEGATIVE
HBV CORE AB SERPL QL IA: NEGATIVE
HBV SURFACE AB SER-ACNC: 17.3 MIU/ML
HCV AB S/CO SERPL IA: 0.1 S/CO RATIO (ref 0–0.9)
INR PPP: 1 (ref 0.8–1.2)
INTERPRETATIONS: ABNORMAL
LIVER FIBR SCORE SERPL CALC.FIBROSURE: 0.05 (ref 0–0.21)
MITOCHONDRIA M2 IGG SER-ACNC: <20 UNITS (ref 0–20)
NECROINFLAMM ACTIVITY SCORING:: ABNORMAL
NECROINFLAMMATORY ACT GRADE SERPL QL: ABNORMAL
NECROINFLAMMATORY ACT SCORE SERPL: 0.42 (ref 0–0.17)
PROTHROMBIN TIME: 10.2 SEC (ref 9.1–12)
SERVICE CMNT-IMP: ABNORMAL

## 2020-08-17 ENCOUNTER — TELEPHONE (OUTPATIENT)
Dept: GASTROENTEROLOGY | Facility: CLINIC | Age: 55
End: 2020-08-17

## 2020-08-17 DIAGNOSIS — R79.89 ELEVATED LFTS: Primary | ICD-10-CM

## 2020-08-17 DIAGNOSIS — K76.0 FATTY LIVER: ICD-10-CM

## 2020-08-17 NOTE — TELEPHONE ENCOUNTER
Called and reviewed all the lab work with the patient directly by telephone  Her transaminases are a little more elevated  Reviewed her fibrosis score F0/A1-A2  Ultrasound showed diffuse hepatic steatosis with mild hepatomegaly  Advised low-fat diet, abstinence from alcohol, gentle weight loss and exercise as able  Please place yearly recall for LFTs and abdominal ultrasound starting August, 2021      Thanks, HPR

## 2020-08-17 NOTE — TELEPHONE ENCOUNTER
Transaminases were both mildly elevated on this lab series  Have advised complete abstinence from alcohol  Will repeat in 3 months (November, 2020)      Thanks HPR

## 2020-10-05 LAB
CREAT ?TM UR-SCNC: 167.9 UMOL/L
EXT MICROALBUMIN URINE RANDOM: 6.2
MICROALBUMIN/CREAT UR: 4 MG/G{CREAT}

## 2020-10-08 DIAGNOSIS — E03.9 HYPOTHYROIDISM, UNSPECIFIED TYPE: ICD-10-CM

## 2020-10-08 RX ORDER — LEVOTHYROXINE SODIUM 0.12 MG/1
TABLET ORAL
Qty: 30 TABLET | Refills: 2 | Status: SHIPPED | OUTPATIENT
Start: 2020-10-08 | End: 2021-01-07 | Stop reason: SDUPTHER

## 2020-11-17 LAB
ALBUMIN SERPL-MCNC: 4.6 G/DL (ref 3.8–4.9)
ALP SERPL-CCNC: 84 IU/L (ref 39–117)
ALT SERPL-CCNC: 75 IU/L (ref 0–32)
AST SERPL-CCNC: 50 IU/L (ref 0–40)
BILIRUB DIRECT SERPL-MCNC: 0.04 MG/DL (ref 0–0.4)
BILIRUB SERPL-MCNC: 0.2 MG/DL (ref 0–1.2)
PROT SERPL-MCNC: 6.8 G/DL (ref 6–8.5)

## 2020-11-30 NOTE — TELEPHONE ENCOUNTER
I left a voicemail offering that I wanted to review her lab results which were about the same  Would like to repeat LFTs in 3 months (February 2021), placed order to [employer]  Can you please mail to the patient?     Thanks HPR

## 2021-01-07 ENCOUNTER — TELEPHONE (OUTPATIENT)
Dept: ENDOCRINOLOGY | Facility: HOSPITAL | Age: 56
End: 2021-01-07

## 2021-01-07 DIAGNOSIS — E03.9 HYPOTHYROIDISM, UNSPECIFIED TYPE: ICD-10-CM

## 2021-01-07 DIAGNOSIS — E03.9 HYPOTHYROIDISM, UNSPECIFIED: ICD-10-CM

## 2021-01-07 RX ORDER — LEVOTHYROXINE SODIUM 125 UG/1
TABLET ORAL
Qty: 30 TABLET | Refills: 2 | OUTPATIENT
Start: 2021-01-07

## 2021-01-07 RX ORDER — LEVOTHYROXINE SODIUM 0.12 MG/1
TABLET ORAL
Qty: 30 TABLET | Refills: 11 | Status: SHIPPED | OUTPATIENT
Start: 2021-01-07 | End: 2022-01-17

## 2021-01-07 RX ORDER — LEVOTHYROXINE SODIUM 0.12 MG/1
TABLET ORAL
Qty: 32 TABLET | Refills: 11 | Status: SHIPPED | OUTPATIENT
Start: 2021-01-07 | End: 2021-01-07 | Stop reason: SDUPTHER

## 2021-01-07 RX ORDER — LEVOTHYROXINE SODIUM 0.12 MG/1
TABLET ORAL
Qty: 30 TABLET | Refills: 11 | Status: SHIPPED | OUTPATIENT
Start: 2021-01-07 | End: 2021-01-07 | Stop reason: SDUPTHER

## 2021-01-07 NOTE — TELEPHONE ENCOUNTER
OK let's increase her dose to 1 tab 6 days of the week and 1 5 tabs sundays and repeat labs in about 2 months

## 2021-01-07 NOTE — TELEPHONE ENCOUNTER
Patient informed  She notes that she is currently taking 1 tablet 6 days of the week  Just to clarify is she still adding 1 5 tabs on Sundays?

## 2021-01-07 NOTE — TELEPHONE ENCOUNTER
Please let the patient know I received labs from 10/05/2020 at [facility] showing a TSH 5 91  This indicates her thyroid levels are slightly lower  Has she missed any doses? If she taking it appropriately, on an empty stomach, waiting to eat or drink at least 30 -60 minutes  And making sure to keep anything such as calcium at least 3-4 hours after the Synthroid?

## 2021-03-03 ENCOUNTER — TELEPHONE (OUTPATIENT)
Dept: GASTROENTEROLOGY | Facility: CLINIC | Age: 56
End: 2021-03-03

## 2021-03-03 DIAGNOSIS — K76.0 FATTY LIVER: ICD-10-CM

## 2021-03-03 DIAGNOSIS — R79.89 ELEVATED LFTS: Primary | ICD-10-CM

## 2021-03-03 LAB
ALBUMIN SERPL-MCNC: 4.4 G/DL (ref 3.8–4.9)
ALP SERPL-CCNC: 89 IU/L (ref 39–117)
ALT SERPL-CCNC: 77 IU/L (ref 0–32)
AST SERPL-CCNC: 40 IU/L (ref 0–40)
BILIRUB DIRECT SERPL-MCNC: 0.08 MG/DL (ref 0–0.4)
BILIRUB SERPL-MCNC: 0.2 MG/DL (ref 0–1.2)
PROT SERPL-MCNC: 6.5 G/DL (ref 6–8.5)
T4 FREE SERPL-MCNC: 1.36 NG/DL (ref 0.82–1.77)
TSH SERPL DL<=0.005 MIU/L-ACNC: 2.17 UIU/ML (ref 0.45–4.5)

## 2021-03-04 NOTE — TELEPHONE ENCOUNTER
There is a recall in for August for LFT's and ultrasound, do you just want her to complete both in June to have done before July ov?

## 2021-03-04 NOTE — TELEPHONE ENCOUNTER
Called and spoke directly to the patient by telephone  Reviewed the results of her LFTs and ultrasound  ALT remains slightly elevated  We reviewed that her fatty liver she needs to follow a low-fat diet, gentle exercise as able and abstinence from EtOH  She is currently doing all of these  Will recheck LFTs in 3 months  She is due for yearly office follow-up in July  I placed an order to [employer]   The patient is aware  Can you please mail her the order?       Thanks HPR

## 2021-03-05 NOTE — TELEPHONE ENCOUNTER
Patient aware of order change and that we will mail out in June/July to complete prior to her follow up visit

## 2021-05-10 DIAGNOSIS — R79.89 ELEVATED LFTS: Primary | ICD-10-CM

## 2021-05-10 DIAGNOSIS — K76.0 FATTY LIVER: ICD-10-CM

## 2021-06-03 ENCOUNTER — TELEPHONE (OUTPATIENT)
Dept: GASTROENTEROLOGY | Facility: CLINIC | Age: 56
End: 2021-06-03

## 2021-06-03 NOTE — TELEPHONE ENCOUNTER
1-yr recall added for LFT's & fiber sure & US Ab (edited prev recall by extending date and adding fiber sure test)

## 2021-06-03 NOTE — TELEPHONE ENCOUNTER
Patient advised as per IK  Will keep scheduled appt in July    Routed to [facility] for recall

## 2021-06-03 NOTE — TELEPHONE ENCOUNTER
Please tell patient ultrasound shows fatty liver  No tumors or other findings  Labs appeared stable  Should follow-up with us as previously planned    Would repeat ultrasound and labs in 1 year, would also include repeat fiber sure

## 2021-06-03 NOTE — TELEPHONE ENCOUNTER
Pt had ultrasound at [facility] on 06/01/2021  Pt is calling for results  Copy scanned into Epic   Copy on your desk

## 2021-07-07 LAB
ALBUMIN SERPL-MCNC: 4.4 G/DL (ref 3.8–4.9)
ALP SERPL-CCNC: 91 IU/L (ref 48–121)
ALT SERPL-CCNC: 68 IU/L (ref 0–32)
AST SERPL-CCNC: 32 IU/L (ref 0–40)
BILIRUB DIRECT SERPL-MCNC: 0.06 MG/DL (ref 0–0.4)
BILIRUB SERPL-MCNC: <0.2 MG/DL (ref 0–1.2)
PROT SERPL-MCNC: 6.5 G/DL (ref 6–8.5)

## 2021-07-15 ENCOUNTER — OFFICE VISIT (OUTPATIENT)
Dept: GASTROENTEROLOGY | Facility: CLINIC | Age: 56
End: 2021-07-15
Payer: COMMERCIAL

## 2021-07-15 VITALS
WEIGHT: 191 LBS | BODY MASS INDEX: 31.82 KG/M2 | SYSTOLIC BLOOD PRESSURE: 136 MMHG | DIASTOLIC BLOOD PRESSURE: 80 MMHG | HEIGHT: 65 IN

## 2021-07-15 DIAGNOSIS — D64.9 ANEMIA, UNSPECIFIED TYPE: Primary | ICD-10-CM

## 2021-07-15 DIAGNOSIS — Z86.010 PERSONAL HISTORY OF COLONIC POLYPS: ICD-10-CM

## 2021-07-15 DIAGNOSIS — K76.0 FATTY LIVER: ICD-10-CM

## 2021-07-15 DIAGNOSIS — K75.81 STEATOHEPATITIS: ICD-10-CM

## 2021-07-15 PROCEDURE — 99214 OFFICE O/P EST MOD 30 MIN: CPT | Performed by: INTERNAL MEDICINE

## 2021-07-15 NOTE — PROGRESS NOTES
[id number] - Outpatient Follow-up Note  Tyrese Ackerman 63 y o  female MRN: [id number]  Encounter: [id number]    ASSESSMENT AND PLAN:      1  Anemia, unspecified type  Resolved essentially negative upper and lower endoscopy    2  Personal history of colonic polyps  5 year follow-up colonoscopy    3  Fatty liver  Noted by ultrasound see below    4  Steatohepatitis  Longstanding low-grade elevation of her transaminases perhaps 1 and half times normal ALT greater than AST  Ultrasound shows fatty liver  This is almost certainly mild steatohepatitis  Other etiologies have been excluded with testing  Her fibrosis score was F0  The ratio of her LFT abnormalities is the opposite of what we would see with alcohol  Having said all that I think her steatohepatitis is related to the metabolic syndrome which she seems to have at least in part  She has high blood pressure her previous hemoglobin A1c was elevated at 6 she has a family history of diabetes  Cholesterol is mildly elevated  I discussed the etiology of steatohepatitis with her  The major treatment is to control the blood sugar blood pressure and cholesterol  There is some value for vitamin E and I prescribed 400 units daily  I would like to see her back again in a year  She has an appointment with her primary doctor Dr Lauri White to discuss further management  Followup Appointment:  1 year  ______________________________________________________________________    Chief Complaint   Patient presents with    Follow-up    Elevated LFTs     HPI:  Patient is a very pleasant 50-year-old female who we saw about a year and a half ago for anemia  Workup included upper and lower endoscopy which were unremarkable except for colon polyps  Her knee me a resolved  She was noted to have abnormal liver enzymes and a seen in follow-up for this  She drinks alcohol only rarely perhaps 10 drinks in the last 6 months    No GI symptoms  Historical Information   Past Medical History:   Diagnosis Date    Anemia     Anxiety     Colon polyp     GERD (gastroesophageal reflux disease)     HTN (hypertension)     Hyperlipidemia     Hypertension     Hypothyroidism      Past Surgical History:   Procedure Laterality Date     SECTION       SECTION      HYSTERECTOMY      THYROIDECTOMY      THYROIDECTOMY      TOTAL ABDOMINAL HYSTERECTOMY W/ BILATERAL SALPINGOOPHORECTOMY      UPPER GASTROINTESTINAL ENDOSCOPY       Social History     Substance and Sexual Activity   Alcohol Use Yes    Comment: ocassionally     Social History     Substance and Sexual Activity   Drug Use No     Social History     Tobacco Use   Smoking Status Current Every Day Smoker    Packs/day: 0 25    Years: 20 00    Pack years: 5 00    Types: Cigarettes   Smokeless Tobacco Never Used   Tobacco Comment    3-6 cigarettes per day        Family History   Problem Relation Age of Onset    Hypertension Mother     Ovarian cancer Mother     Hypothyroidism Mother     Hypertension Father     Hyperlipidemia Father     Heart disease Father     Colon cancer Father         Rectal cancer    Rectal cancer Father     Hypertension Sister     Hypertension Brother     Hypertension Daughter     Hypertension Sister     Hypertension Brother     No Known Problems Brother     No Known Problems Brother     Colon polyps Neg Hx          Current Outpatient Medications:     amitriptyline (ELAVIL) 10 mg tablet    cholecalciferol (VITAMIN D3) 1,000 units tablet    cyanocobalamin (VITAMIN B-12) 250 MCG tablet    levothyroxine (Synthroid) 125 mcg tablet    Loratadine (CLARITIN) 10 MG CAPS    losartan-hydrochlorothiazide (HYZAAR) 100-25 MG per tablet    mometasone (NASONEX) 50 mcg/act nasal spray    Multiple Vitamin (MULTIVITAMIN) capsule    Omega-3 Fatty Acids (FISH OIL) 1,000 mg    pantoprazole (PROTONIX) 40 mg tablet    Thiamine HCl (VITAMIN B1 PO)   fluorouracil (EFUDEX) 5 % cream  No Known Allergies  Reviewed medications and allergies and updated as indicated    PHYSICAL EXAM:    Blood pressure 136/80, height 5' 5" (1 651 m), weight 86 6 kg (191 lb)  Body mass index is 31 78 kg/m²  General Appearance: NAD, cooperative, alert  Eyes: Anicteric, PERRLA, EOMI  ENT:  Normocephalic, atraumatic, normal mucosa  Neck:  Supple, symmetrical, trachea midline  Resp:  Clear to auscultation bilaterally; no rales, rhonchi or wheezing; respirations unlabored   CV:  S1 S2, Regular rate and rhythm; no murmur, rub, or gallop  GI:  Soft, non-tender, non-distended; normal bowel sounds; no masses, no organomegaly   Rectal: Deferred  Musculoskeletal: No cyanosis, clubbing or edema  Normal ROM  Skin:  No jaundice, rashes, or lesions   Heme/Lymph: No palpable cervical lymphadenopathy  Psych: Normal affect, good eye contact  Neuro: No gross deficits, AAOx3    Lab Results:   Lab Results   Component Value Date    WBC 5 9 08/11/2020    HGB 13 8 08/11/2020    HCT 42 0 08/11/2020    MCV 90 08/11/2020     08/11/2020     Lab Results   Component Value Date    K 4 0 08/04/2020     08/04/2020    CO2 24 08/04/2020    BUN 14 08/04/2020    CREATININE 0 69 08/04/2020    GLUCOSE 104 01/20/2018    AST 32 07/06/2021    ALT 68 (H) 07/06/2021    EGFR 104 01/20/2018     Lab Results   Component Value Date    IRON 68 03/17/2020    TIBC 280 03/17/2020    FERRITIN 333 (H) 08/11/2020     No results found for: LIPASE    Radiology Results:   No results found

## 2021-07-15 NOTE — LETTER
July 15, 2021     [facility]  [id number]    Patient: Katie Bonds   YOB: 1965   Date of Visit: 7/15/2021       Dear Dr Mary Shepherd: Thank you for referring Saji Chilel to me for evaluation  Below are my notes for this consultation  If you have questions, please do not hesitate to call me  I look forward to following your patient along with you  Sincerely,        Tanika Dwyer MD        CC: DO Tanika Gil MD  7/15/2021  5:19 PM  Incomplete  [id number] - Outpatient Follow-up Note  Katie Bonds 63 y o  female MRN: [id number]  Encounter: [id number]    ASSESSMENT AND PLAN:      1  Anemia, unspecified type  Resolved essentially negative upper and lower endoscopy    2  Personal history of colonic polyps  5 year follow-up colonoscopy    3  Fatty liver  Noted by ultrasound see below    4  Steatohepatitis  Longstanding low-grade elevation of her transaminases perhaps 1 and half times normal ALT greater than AST  Ultrasound shows fatty liver  This is almost certainly mild steatohepatitis  Other etiologies have been excluded with testing  Her fibrosis score was F0  The ratio of her LFT abnormalities is the opposite of what we would see with alcohol  Having said all that I think her steatohepatitis is related to the metabolic syndrome which she seems to have at least in part  She has high blood pressure her previous hemoglobin A1c was elevated at 6 she has a family history of diabetes  Cholesterol is mildly elevated  I discussed the etiology of steatohepatitis with her  The major treatment is to control the blood sugar blood pressure and cholesterol  There is some value for vitamin E and I prescribed 400 units daily  I would like to see her back again in a year  She has an appointment with her primary doctor Dr Thompson Peck to discuss further management        Followup Appointment:  1 year  ______________________________________________________________________    Chief Complaint   Patient presents with    Follow-up    Elevated LFTs     HPI:  Patient is a very pleasant 50-year-old female who we saw about a year and a half ago for anemia  Workup included upper and lower endoscopy which were unremarkable except for colon polyps  Her knee me a resolved  She was noted to have abnormal liver enzymes and a seen in follow-up for this  She drinks alcohol only rarely perhaps 10 drinks in the last 6 months  No GI symptoms  Historical Information   Past Medical History:   Diagnosis Date    Anemia     Anxiety     Colon polyp     GERD (gastroesophageal reflux disease)     HTN (hypertension)     Hyperlipidemia     Hypertension     Hypothyroidism      Past Surgical History:   Procedure Laterality Date     SECTION       SECTION      HYSTERECTOMY      THYROIDECTOMY      THYROIDECTOMY      TOTAL ABDOMINAL HYSTERECTOMY W/ BILATERAL SALPINGOOPHORECTOMY      UPPER GASTROINTESTINAL ENDOSCOPY       Social History     Substance and Sexual Activity   Alcohol Use Yes    Comment: ocassionally     Social History     Substance and Sexual Activity   Drug Use No     Social History     Tobacco Use   Smoking Status Current Every Day Smoker    Packs/day: 0 25    Years: 20 00    Pack years: 5 00    Types: Cigarettes   Smokeless Tobacco Never Used   Tobacco Comment    3-6 cigarettes per day        Family History   Problem Relation Age of Onset    Hypertension Mother     Ovarian cancer Mother     Hypothyroidism Mother     Hypertension Father     Hyperlipidemia Father     Heart disease Father     Colon cancer Father         Rectal cancer    Rectal cancer Father     Hypertension Sister     Hypertension Brother     Hypertension Daughter     Hypertension Sister     Hypertension Brother     No Known Problems Brother     No Known Problems Brother     Colon polyps Neg Hx          Current Outpatient Medications:     amitriptyline (ELAVIL) 10 mg tablet    cholecalciferol (VITAMIN D3) 1,000 units tablet    cyanocobalamin (VITAMIN B-12) 250 MCG tablet    levothyroxine (Synthroid) 125 mcg tablet    Loratadine (CLARITIN) 10 MG CAPS    losartan-hydrochlorothiazide (HYZAAR) 100-25 MG per tablet    mometasone (NASONEX) 50 mcg/act nasal spray    Multiple Vitamin (MULTIVITAMIN) capsule    Omega-3 Fatty Acids (FISH OIL) 1,000 mg    pantoprazole (PROTONIX) 40 mg tablet    Thiamine HCl (VITAMIN B1 PO)    fluorouracil (EFUDEX) 5 % cream  No Known Allergies  Reviewed medications and allergies and updated as indicated    PHYSICAL EXAM:    Blood pressure 136/80, height 5' 5" (1 651 m), weight 86 6 kg (191 lb)  Body mass index is 31 78 kg/m²  General Appearance: NAD, cooperative, alert  Eyes: Anicteric, PERRLA, EOMI  ENT:  Normocephalic, atraumatic, normal mucosa  Neck:  Supple, symmetrical, trachea midline  Resp:  Clear to auscultation bilaterally; no rales, rhonchi or wheezing; respirations unlabored   CV:  S1 S2, Regular rate and rhythm; no murmur, rub, or gallop  GI:  Soft, non-tender, non-distended; normal bowel sounds; no masses, no organomegaly   Rectal: Deferred  Musculoskeletal: No cyanosis, clubbing or edema  Normal ROM    Skin:  No jaundice, rashes, or lesions   Heme/Lymph: No palpable cervical lymphadenopathy  Psych: Normal affect, good eye contact  Neuro: No gross deficits, AAOx3    Lab Results:   Lab Results   Component Value Date    WBC 5 9 08/11/2020    HGB 13 8 08/11/2020    HCT 42 0 08/11/2020    MCV 90 08/11/2020     08/11/2020     Lab Results   Component Value Date    K 4 0 08/04/2020     08/04/2020    CO2 24 08/04/2020    BUN 14 08/04/2020    CREATININE 0 69 08/04/2020    GLUCOSE 104 01/20/2018    AST 32 07/06/2021    ALT 68 (H) 07/06/2021    EGFR 104 01/20/2018     Lab Results   Component Value Date    IRON 68 03/17/2020    TIBC 280 03/17/2020 FERRITIN 333 (H) 08/11/2020     No results found for: LIPASE    Radiology Results:   No results found

## 2021-08-13 LAB
ALBUMIN SERPL-MCNC: 4.4 G/DL (ref 3.8–4.9)
ALBUMIN/GLOB SERPL: 2.2 {RATIO} (ref 1.2–2.2)
ALP SERPL-CCNC: 80 IU/L (ref 48–121)
ALT SERPL-CCNC: 84 IU/L (ref 0–32)
AST SERPL-CCNC: 40 IU/L (ref 0–40)
BILIRUB SERPL-MCNC: 0.3 MG/DL (ref 0–1.2)
BUN SERPL-MCNC: 13 MG/DL (ref 6–24)
BUN/CREAT SERPL: 19 (ref 9–23)
CALCIUM SERPL-MCNC: 9.1 MG/DL (ref 8.7–10.2)
CHLORIDE SERPL-SCNC: 105 MMOL/L (ref 96–106)
CHOLEST SERPL-MCNC: 213 MG/DL (ref 100–199)
CO2 SERPL-SCNC: 24 MMOL/L (ref 20–29)
CREAT SERPL-MCNC: 0.68 MG/DL (ref 0.57–1)
EST. AVERAGE GLUCOSE BLD GHB EST-MCNC: 123 MG/DL
GLOBULIN SER-MCNC: 2 G/DL (ref 1.5–4.5)
GLUCOSE SERPL-MCNC: 108 MG/DL (ref 65–99)
HBA1C MFR BLD: 5.9 % (ref 4.8–5.6)
HDLC SERPL-MCNC: 34 MG/DL
LDLC SERPL CALC-MCNC: 138 MG/DL (ref 0–99)
LDLC/HDLC SERPL: 4.1 RATIO (ref 0–3.2)
POTASSIUM SERPL-SCNC: 4 MMOL/L (ref 3.5–5.2)
PROT SERPL-MCNC: 6.4 G/DL (ref 6–8.5)
SL AMB EGFR AFRICAN AMERICAN: 113 ML/MIN/1.73
SL AMB EGFR NON AFRICAN AMERICAN: 98 ML/MIN/1.73
SL AMB VLDL CHOLESTEROL CALC: 41 MG/DL (ref 5–40)
SODIUM SERPL-SCNC: 143 MMOL/L (ref 134–144)
T4 FREE SERPL-MCNC: 1.41 NG/DL (ref 0.82–1.77)
TRIGL SERPL-MCNC: 226 MG/DL (ref 0–149)
TSH SERPL DL<=0.005 MIU/L-ACNC: 2.96 UIU/ML (ref 0.45–4.5)

## 2021-08-17 ENCOUNTER — OFFICE VISIT (OUTPATIENT)
Dept: ENDOCRINOLOGY | Facility: HOSPITAL | Age: 56
End: 2021-08-17
Payer: COMMERCIAL

## 2021-08-17 VITALS
HEIGHT: 65 IN | HEART RATE: 77 BPM | WEIGHT: 193.2 LBS | BODY MASS INDEX: 32.19 KG/M2 | DIASTOLIC BLOOD PRESSURE: 98 MMHG | SYSTOLIC BLOOD PRESSURE: 168 MMHG

## 2021-08-17 DIAGNOSIS — E89.0 POSTOPERATIVE HYPOTHYROIDISM: ICD-10-CM

## 2021-08-17 DIAGNOSIS — R73.03 PREDIABETES: ICD-10-CM

## 2021-08-17 DIAGNOSIS — E55.9 VITAMIN D DEFICIENCY: ICD-10-CM

## 2021-08-17 DIAGNOSIS — E78.5 HYPERLIPIDEMIA, UNSPECIFIED HYPERLIPIDEMIA TYPE: Primary | ICD-10-CM

## 2021-08-17 PROCEDURE — 99213 OFFICE O/P EST LOW 20 MIN: CPT | Performed by: INTERNAL MEDICINE

## 2021-08-17 RX ORDER — LOSARTAN POTASSIUM 100 MG/1
100 TABLET ORAL DAILY
COMMUNITY
Start: 2021-07-22

## 2021-08-17 RX ORDER — HYDROCHLOROTHIAZIDE 25 MG/1
25 TABLET ORAL DAILY
COMMUNITY
Start: 2021-07-22

## 2021-08-17 NOTE — PROGRESS NOTES
8/17/2021    Assessment/Plan      There are no diagnoses linked to this encounter  Assessment/Plan:    1  Hypothyroidism: Clinically and biochemically doing well on current regimen  Follow-up in 1 year with labs as ordered above just prior  2  Hyperlipidemia:  Discussed with patient we should consider treatment  She will focus on lifestyle measures and we will repeat lipid panel in the fall  We will call her with the results  Consider CAC in addition to repeat ascvd risk score calculation when repeat lipid panel is back  3  Vitamin-D deficiency:  Continue supplementation and check level prior to next appointment  4  Prediabetes: Lifestyle measures  Monitor annually  CC: Follow-up    History of Present Illness     HPI: Tyrese Ackerman is a 63y o  year old female with history of   Hypothyroidism, prediabetes who presents for a follow-up appointment  In 2010 she underwent surgery for thyroid nodules and fortunately pathology came back benign  She is treated with Synthroid brand 125 mcg daily with 0 5 tablet on Sunday  She also has vitamin-D deficiency and supplements with 2000 units daily  She presents today overall feeling well  No new health issues or symptoms of concern  Review of Systems   Constitutional: Negative for fatigue  HENT: Negative for trouble swallowing and voice change  Eyes: Negative for visual disturbance  Respiratory: Negative for shortness of breath  Cardiovascular: Negative for palpitations and leg swelling  Gastrointestinal: Negative for abdominal pain, nausea and vomiting  Endocrine: Negative for polydipsia and polyuria  Musculoskeletal: Negative for arthralgias and myalgias  Skin: Negative for rash  Neurological: Negative for dizziness, tremors and weakness  Hematological: Negative for adenopathy  Psychiatric/Behavioral: Negative for agitation and confusion         Historical Information   Past Medical History:   Diagnosis Date    Anemia     Anxiety     Colon polyp     GERD (gastroesophageal reflux disease)     HTN (hypertension)     Hyperlipidemia     Hypertension     Hypothyroidism      Past Surgical History:   Procedure Laterality Date     SECTION       SECTION      HYSTERECTOMY      THYROIDECTOMY      THYROIDECTOMY      TOTAL ABDOMINAL HYSTERECTOMY W/ BILATERAL SALPINGOOPHORECTOMY      UPPER GASTROINTESTINAL ENDOSCOPY       Social History   Social History     Substance and Sexual Activity   Alcohol Use Yes    Comment: ocassionally     Social History     Substance and Sexual Activity   Drug Use No     Social History     Tobacco Use   Smoking Status Current Every Day Smoker    Packs/day: 0 25    Years: 20 00    Pack years: 5 00    Types: Cigarettes   Smokeless Tobacco Never Used   Tobacco Comment    3-6 cigarettes per day        Family History:   Family History   Problem Relation Age of Onset    Hypertension Mother     Ovarian cancer Mother     Hypothyroidism Mother     Hypertension Father     Hyperlipidemia Father     Heart disease Father     Colon cancer Father         Rectal cancer    Rectal cancer Father     Hypertension Sister     Hypertension Brother     Hypertension Daughter     Hypertension Sister     Hypertension Brother     No Known Problems Brother     No Known Problems Brother     Colon polyps Neg Hx        Meds/Allergies   Current Outpatient Medications   Medication Sig Dispense Refill    amitriptyline (ELAVIL) 10 mg tablet Take 5 mg by mouth daily at bedtime       cholecalciferol (VITAMIN D3) 1,000 units tablet Take 2,000 Units by mouth daily      cyanocobalamin (VITAMIN B-12) 250 MCG tablet Take 500 mcg by mouth daily      hydrochlorothiazide (HYDRODIURIL) 25 mg tablet Take 25 mg by mouth daily      levothyroxine (Synthroid) 125 mcg tablet 1 tab 6 days a week, 0 5 tablets on Sundays 30 tablet 11    Loratadine (CLARITIN) 10 MG CAPS Take 1 capsule by mouth as needed       losartan (COZAAR) 100 MG tablet Take 100 mg by mouth daily      mometasone (NASONEX) 50 mcg/act nasal spray 2 sprays into each nostril daily      Multiple Vitamin (MULTIVITAMIN) capsule Take 1 capsule by mouth daily      Omega-3 Fatty Acids (FISH OIL) 1,000 mg Take 1,000 mg by mouth daily      pantoprazole (PROTONIX) 40 mg tablet TAKE 1 TABLET BY MOUTH DAILY 30 tablet 3    Thiamine HCl (VITAMIN B1 PO) Take 1,000 mg by mouth daily      vitamin E 100 UNIT capsule Take 100 Units by mouth daily      fluorouracil (EFUDEX) 5 % cream APPLY CREAM TOPICALLY TO AFFECTED AREA TWICE DAILY FOR 2 4 WEEKS AS TOLERATED (Patient not taking: Reported on 8/17/2021)  0     No current facility-administered medications for this visit  No Known Allergies    Objective   Vitals: Blood pressure 168/98, pulse 77, height 5' 5" (1 651 m), weight 87 6 kg (193 lb 3 2 oz)  Invasive Devices     None                 Physical Exam  Vitals reviewed  Constitutional:       General: She is not in acute distress  Appearance: She is well-developed  She is not diaphoretic  HENT:      Head: Normocephalic and atraumatic  Eyes:      Conjunctiva/sclera: Conjunctivae normal       Pupils: Pupils are equal, round, and reactive to light  Neck:      Thyroid: No thyromegaly  Cardiovascular:      Rate and Rhythm: Normal rate and regular rhythm  Pulmonary:      Effort: Pulmonary effort is normal  No respiratory distress  Breath sounds: Normal breath sounds  Abdominal:      General: Bowel sounds are normal       Palpations: Abdomen is soft  Musculoskeletal:         General: Normal range of motion  Cervical back: Normal range of motion and neck supple  Skin:     General: Skin is warm and dry  Findings: No rash  Neurological:      Mental Status: She is alert and oriented to person, place, and time  Motor: No abnormal muscle tone     Psychiatric:         Behavior: Behavior normal          The history was obtained from the review of the chart and from the patient      Lab Results:      Recent Results (from the past 51100 hour(s))   Hepatic function panel    Collection Time: 08/04/20  9:20 AM   Result Value Ref Range    Protein, Total 7 0 6 0 - 8 5 g/dL    Albumin 5 0 (H) 3 8 - 4 9 g/dL    TOTAL BILIRUBIN 0 4 0 0 - 1 2 mg/dL    Bilirubin, Direct 0 10 0 00 - 0 40 mg/dL    Alk Phos Isoenzymes 73 39 - 117 IU/L    AST 36 0 - 40 IU/L    ALT 63 (H) 0 - 32 IU/L   HEMOGLOBIN A1C W/ EAG ESTIMATION Lab Collect    Collection Time: 08/04/20  9:21 AM   Result Value Ref Range    Hemoglobin A1C 5 5 4 8 - 5 6 %    Estimated Average Glucose 111 mg/dL   Comprehensive metabolic panel Lab Collect    Collection Time: 08/04/20  9:21 AM   Result Value Ref Range    Glucose, Random 102 (H) 65 - 99 mg/dL    BUN 14 6 - 24 mg/dL    Creatinine 0 69 0 57 - 1 00 mg/dL    eGFR Non  98 >59 mL/min/1 73    eGFR  113 >59 mL/min/1 73    SL AMB BUN/CREATININE RATIO 20 9 - 23    Sodium 142 134 - 144 mmol/L    Potassium 4 0 3 5 - 5 2 mmol/L    Chloride 102 96 - 106 mmol/L    CO2 24 20 - 29 mmol/L    CALCIUM 9 6 8 7 - 10 2 mg/dL    Protein, Total 7 0 6 0 - 8 5 g/dL    Albumin 4 9 3 8 - 4 9 g/dL    Globulin, Total 2 1 1 5 - 4 5 g/dL    Albumin/Globulin Ratio 2 3 (H) 1 2 - 2 2    TOTAL BILIRUBIN 0 4 0 0 - 1 2 mg/dL    Alk Phos Isoenzymes 75 39 - 117 IU/L    AST 39 0 - 40 IU/L    ALT 65 (H) 0 - 32 IU/L   TSH, 3rd generation Lab Collect    Collection Time: 08/04/20  9:21 AM   Result Value Ref Range    TSH 2 680 0 450 - 4 500 uIU/mL   T4, free Lab Collect    Collection Time: 08/04/20  9:21 AM   Result Value Ref Range    Free t4 1 27 0 82 - 1 77 ng/dL   Lipid Panel with Direct LDL reflex Lab Collect    Collection Time: 08/04/20  9:21 AM   Result Value Ref Range    Cholesterol, Total 224 (H) 100 - 199 mg/dL    Triglycerides 297 (H) 0 - 149 mg/dL    HDL 39 (L) >39 mg/dL    VLDL Cholesterol Calculated 59 (H) 5 - 40 mg/dL    LDL Calculated 126 (H) 0 - 99 mg/dL    LDl/HDL Ratio 3 2 0 0 - 3 2 ratio   CBC    Collection Time: 08/11/20 11:29 AM   Result Value Ref Range    White Blood Cell Count 5 9 3 4 - 10 8 x10E3/uL    Red Blood Cell Count 4 67 3 77 - 5 28 x10E6/uL    Hemoglobin 13 8 11 1 - 15 9 g/dL    HCT 42 0 34 0 - 46 6 %    MCV 90 79 - 97 fL    MCH 29 6 26 6 - 33 0 pg    MCHC 32 9 31 5 - 35 7 g/dL    RDW 13 4 11 7 - 15 4 %    Platelet Count 649 721 - 450 x10E3/uL   Hepatic function panel    Collection Time: 08/11/20 11:29 AM   Result Value Ref Range    Protein, Total 7 1 6 0 - 8 5 g/dL    Albumin 4 9 3 8 - 4 9 g/dL    TOTAL BILIRUBIN 0 3 0 0 - 1 2 mg/dL    Bilirubin, Direct 0 08 0 00 - 0 40 mg/dL    Alk Phos Isoenzymes 77 39 - 117 IU/L    AST 42 (H) 0 - 40 IU/L    ALT 81 (H) 0 - 32 IU/L   Ferritin    Collection Time: 08/11/20 11:29 AM   Result Value Ref Range    Ferritin 333 (H) 15 - 150 ng/mL   Alpha-1-antitrypsin    Collection Time: 08/11/20 11:29 AM   Result Value Ref Range    A-1 Antitrypsin 110 101 - 187 mg/dL   CHRISTOPH w/Reflex    Collection Time: 08/11/20 11:29 AM   Result Value Ref Range    CHRISTOPH Negative Negative   Antimitochondrial antibody    Collection Time: 08/11/20 11:29 AM   Result Value Ref Range    Mitochondrial Ab <20 0 0 0 - 20 0 Units   HCV FIBROSURE    Collection Time: 08/11/20 11:29 AM   Result Value Ref Range    Fibrosis Score 0 05 0 00 - 0 21    Fibrosis Stage Comment     Necroinflammat Activity Score 0 42 (H) 0 00 - 0 17    Necroinflammat Activity Grade A1-A2     Alpha 2-Macroglobulins, Qn 95 (L) 110 - 276 mg/dL    Haptoglobin 119 33 - 346 mg/dL    Apolipoprotein A-1 118 116 - 209 mg/dL    BILIRUBIN, TOTAL 0 3 0 0 - 1 2 mg/dL    GGT 28 0 - 60 IU/L    ALT (SGPT) 88 (H) 0 - 40 IU/L    INTERPRETATIONS Comment     Fibrosis Scoring: Comment     Necroinflamm Activity Scoring: Comment     Limitations: Comment     Comment Comment    Hepatitis A antibody, total    Collection Time: 08/11/20 11:29 AM   Result Value Ref Range    Hep A Ab, Total Negative Negative   Hepatitis B core antibody, total    Collection Time: 08/11/20 11:29 AM   Result Value Ref Range    Hep B Core Total Ab Negative Negative   Hepatitis B surface antibody    Collection Time: 08/11/20 11:29 AM   Result Value Ref Range    Hepatitis B Surface Ab Quant 17 3 Immunity>9 9 mIU/mL   Hepatitis C antibody    Collection Time: 08/11/20 11:29 AM   Result Value Ref Range    HEP C AB 0 1 0 0 - 0 9 s/co ratio   Protime-INR    Collection Time: 08/11/20 11:29 AM   Result Value Ref Range    INR 1 0 0 8 - 1 2    Prothrombin Time 10 2 9 1 - 12 0 sec   Anti-smooth muscle antibody, IgG    Collection Time: 08/11/20 11:29 AM   Result Value Ref Range    Smooth Muscle Ab 5 0 - 19 Units   Microalbumin / creatinine urine ratio    Collection Time: 10/05/20 12:00 AM   Result Value Ref Range    EXT Creatinine Urine 167 9     Microalbum  ,U,Random 6 2     EXTERNAL Microalb/Creat Ratio 4    Hepatic function panel    Collection Time: 11/16/20 11:43 AM   Result Value Ref Range    Protein, Total 6 8 6 0 - 8 5 g/dL    Albumin 4 6 3 8 - 4 9 g/dL    TOTAL BILIRUBIN 0 2 0 0 - 1 2 mg/dL    Bilirubin, Direct 0 04 0 00 - 0 40 mg/dL    Alk Phos Isoenzymes 84 39 - 117 IU/L    AST 50 (H) 0 - 40 IU/L    ALT 75 (H) 0 - 32 IU/L   TSH, 3rd generation    Collection Time: 03/02/21  2:11 PM   Result Value Ref Range    TSH 2 170 0 450 - 4 500 uIU/mL   T4, free    Collection Time: 03/02/21  2:11 PM   Result Value Ref Range    Free t4 1 36 0 82 - 1 77 ng/dL   Hepatic function panel    Collection Time: 03/02/21  2:12 PM   Result Value Ref Range    Protein, Total 6 5 6 0 - 8 5 g/dL    Albumin 4 4 3 8 - 4 9 g/dL    TOTAL BILIRUBIN 0 2 0 0 - 1 2 mg/dL    Bilirubin, Direct 0 08 0 00 - 0 40 mg/dL    Alk Phos Isoenzymes 89 39 - 117 IU/L    AST 40 0 - 40 IU/L    ALT 77 (H) 0 - 32 IU/L   Hepatic function panel    Collection Time: 07/06/21  2:13 PM   Result Value Ref Range    Protein, Total 6 5 6 0 - 8 5 g/dL    Albumin 4 4 3 8 - 4 9 g/dL    TOTAL BILIRUBIN <0 2 0 0 - 1 2 mg/dL    Bilirubin, Direct 0 06 0 00 - 0 40 mg/dL    Alk Phos Isoenzymes 91 48 - 121 IU/L    AST 32 0 - 40 IU/L    ALT 68 (H) 0 - 32 IU/L   Comprehensive metabolic panel    Collection Time: 08/12/21  7:38 AM   Result Value Ref Range    Glucose, Random 108 (H) 65 - 99 mg/dL    BUN 13 6 - 24 mg/dL    Creatinine 0 68 0 57 - 1 00 mg/dL    eGFR Non  98 >59 mL/min/1 73    eGFR  113 >59 mL/min/1 73    SL AMB BUN/CREATININE RATIO 19 9 - 23    Sodium 143 134 - 144 mmol/L    Potassium 4 0 3 5 - 5 2 mmol/L    Chloride 105 96 - 106 mmol/L    CO2 24 20 - 29 mmol/L    CALCIUM 9 1 8 7 - 10 2 mg/dL    Protein, Total 6 4 6 0 - 8 5 g/dL    Albumin 4 4 3 8 - 4 9 g/dL    Globulin, Total 2 0 1 5 - 4 5 g/dL    Albumin/Globulin Ratio 2 2 1 2 - 2 2    TOTAL BILIRUBIN 0 3 0 0 - 1 2 mg/dL    Alk Phos Isoenzymes 80 48 - 121 IU/L    AST 40 0 - 40 IU/L    ALT 84 (H) 0 - 32 IU/L   Lipid Panel with Direct LDL reflex    Collection Time: 08/12/21  7:38 AM   Result Value Ref Range    Cholesterol, Total 213 (H) 100 - 199 mg/dL    Triglycerides 226 (H) 0 - 149 mg/dL    HDL 34 (L) >39 mg/dL    VLDL Cholesterol Calculated 41 (H) 5 - 40 mg/dL    LDL Calculated 138 (H) 0 - 99 mg/dL    LDl/HDL Ratio 4 1 (H) 0 0 - 3 2 ratio   Hemoglobin A1C    Collection Time: 08/12/21  7:38 AM   Result Value Ref Range    Hemoglobin A1C 5 9 (H) 4 8 - 5 6 %    Estimated Average Glucose 123 mg/dL   T4, free    Collection Time: 08/12/21  7:38 AM   Result Value Ref Range    Free t4 1 41 0 82 - 1 77 ng/dL   TSH, 3rd generation    Collection Time: 08/12/21  7:38 AM   Result Value Ref Range    TSH 2 960 0 450 - 4 500 uIU/mL         No future appointments  Portions of the record may have been created with voice recognition software  Occasional wrong word or "sound a like" substitutions may have occurred due to the inherent limitations of voice recognition software   Read the chart carefully and recognize, using context, where substitutions have occurred

## 2021-10-14 DIAGNOSIS — D64.9 ANEMIA, UNSPECIFIED TYPE: ICD-10-CM

## 2021-10-14 RX ORDER — PANTOPRAZOLE SODIUM 40 MG/1
TABLET, DELAYED RELEASE ORAL
Qty: 30 TABLET | Refills: 3 | Status: SHIPPED | OUTPATIENT
Start: 2021-10-14

## 2021-10-19 ENCOUNTER — TELEPHONE (OUTPATIENT)
Dept: ENDOCRINOLOGY | Facility: HOSPITAL | Age: 56
End: 2021-10-19

## 2022-01-17 DIAGNOSIS — E03.9 HYPOTHYROIDISM, UNSPECIFIED TYPE: ICD-10-CM

## 2022-01-17 RX ORDER — LEVOTHYROXINE SODIUM 0.12 MG/1
TABLET ORAL
Qty: 30 TABLET | Refills: 11 | Status: SHIPPED | OUTPATIENT
Start: 2022-01-17 | End: 2022-03-21 | Stop reason: SDUPTHER

## 2022-03-18 ENCOUNTER — TELEPHONE (OUTPATIENT)
Dept: ENDOCRINOLOGY | Facility: HOSPITAL | Age: 57
End: 2022-03-18

## 2022-03-18 DIAGNOSIS — E03.9 HYPOTHYROIDISM, UNSPECIFIED TYPE: ICD-10-CM

## 2022-03-18 NOTE — TELEPHONE ENCOUNTER
Received call from patient, please review recent lab results   Report was requested from [employer]

## 2022-03-21 RX ORDER — LEVOTHYROXINE SODIUM 0.12 MG/1
TABLET ORAL
Qty: 30 TABLET | Refills: 11
Start: 2022-03-21 | End: 2022-03-24 | Stop reason: SDUPTHER

## 2022-03-21 NOTE — TELEPHONE ENCOUNTER
Let's have her increase her dose to 1 tab all 7 days of the week and repeat tsh and free t4 in about 2 months

## 2022-03-21 NOTE — TELEPHONE ENCOUNTER
Recent labs from 03/15/2022 showed her TSH is slightly high at 6 33 with a normal free T4 1 0  This would indicate that her thyroid levels are slightly low  Any missed doses of her thyroid medication?

## 2022-03-23 ENCOUNTER — TELEPHONE (OUTPATIENT)
Dept: OTHER | Facility: OTHER | Age: 57
End: 2022-03-23

## 2022-03-23 DIAGNOSIS — E03.9 HYPOTHYROIDISM, UNSPECIFIED TYPE: ICD-10-CM

## 2022-03-23 NOTE — TELEPHONE ENCOUNTER
Pt called in stating her TSH was low and she was told to change the levothyroxine (Synthroid) 125 mcg tablet [[id number]]   to once per day, but that's what she was already doing  She is requesting a call back

## 2022-03-24 RX ORDER — LEVOTHYROXINE SODIUM 0.12 MG/1
TABLET ORAL
Qty: 30 TABLET | Refills: 11
Start: 2022-03-24 | End: 2022-05-11 | Stop reason: SDUPTHER

## 2022-03-24 NOTE — TELEPHONE ENCOUNTER
Would increase her dose to 1 tab 6 days of the week and 1 5 tabs sundays then and repeat labs as planned

## 2022-05-02 ENCOUNTER — TELEPHONE (OUTPATIENT)
Dept: ENDOCRINOLOGY | Facility: HOSPITAL | Age: 57
End: 2022-05-02

## 2022-05-02 NOTE — TELEPHONE ENCOUNTER
Patient left a message stating that we changed her thyroid medication and is confused on her dose  Just to confirm she is taking 1 tablet 6 days of the week and 1 5 tabs on sundays correct?

## 2022-05-05 DIAGNOSIS — K75.81 STEATOHEPATITIS: Primary | ICD-10-CM

## 2022-05-05 DIAGNOSIS — R79.89 ELEVATED LFTS: ICD-10-CM

## 2022-05-11 DIAGNOSIS — E03.9 HYPOTHYROIDISM, UNSPECIFIED TYPE: Primary | ICD-10-CM

## 2022-05-11 RX ORDER — LEVOTHYROXINE SODIUM 0.12 MG/1
TABLET ORAL
Qty: 32 TABLET | Refills: 11 | Status: SHIPPED | OUTPATIENT
Start: 2022-05-11

## 2022-05-27 LAB
T4 FREE SERPL-MCNC: 1.16 NG/DL (ref 0.82–1.77)
TSH SERPL DL<=0.005 MIU/L-ACNC: 1.04 UIU/ML (ref 0.45–4.5)

## 2022-05-28 LAB
A2 MACROGLOB SERPL-MCNC: 100 MG/DL (ref 110–276)
ALBUMIN SERPL-MCNC: 4.7 G/DL (ref 3.8–4.9)
ALP SERPL-CCNC: 82 IU/L (ref 44–121)
ALT SERPL W P-5'-P-CCNC: 94 IU/L (ref 0–40)
ALT SERPL-CCNC: 82 IU/L (ref 0–32)
APO A-I SERPL-MCNC: 128 MG/DL (ref 116–209)
AST SERPL-CCNC: 43 IU/L (ref 0–40)
BILIRUB DIRECT SERPL-MCNC: 0.11 MG/DL (ref 0–0.4)
BILIRUB SERPL-MCNC: 0.2 MG/DL (ref 0–1.2)
BILIRUB SERPL-MCNC: 0.2 MG/DL (ref 0–1.2)
COMMENT: ABNORMAL
FIBROSIS SCORING:: ABNORMAL
FIBROSIS STAGE SERPL QL: ABNORMAL
GGT SERPL-CCNC: 40 IU/L (ref 0–60)
HAPTOGLOB SERPL-MCNC: 124 MG/DL (ref 33–346)
INTERPRETATIONS: ABNORMAL
LIVER FIBR SCORE SERPL CALC.FIBROSURE: 0.05 (ref 0–0.21)
NECROINFLAMM ACTIVITY SCORING:: ABNORMAL
NECROINFLAMMATORY ACT GRADE SERPL QL: ABNORMAL
NECROINFLAMMATORY ACT SCORE SERPL: 0.45 (ref 0–0.17)
PROT SERPL-MCNC: 6.8 G/DL (ref 6–8.5)
SERVICE CMNT-IMP: ABNORMAL

## 2022-07-12 ENCOUNTER — OFFICE VISIT (OUTPATIENT)
Dept: GASTROENTEROLOGY | Facility: CLINIC | Age: 57
End: 2022-07-12
Payer: COMMERCIAL

## 2022-07-12 VITALS
WEIGHT: 190.6 LBS | HEIGHT: 65 IN | BODY MASS INDEX: 31.75 KG/M2 | DIASTOLIC BLOOD PRESSURE: 86 MMHG | SYSTOLIC BLOOD PRESSURE: 132 MMHG

## 2022-07-12 DIAGNOSIS — Z86.010 PERSONAL HISTORY OF COLONIC POLYPS: ICD-10-CM

## 2022-07-12 DIAGNOSIS — R79.89 LFT ELEVATION: Primary | ICD-10-CM

## 2022-07-12 DIAGNOSIS — K76.0 FATTY LIVER: ICD-10-CM

## 2022-07-12 DIAGNOSIS — F31.9 BIPOLAR 1 DISORDER (HCC): ICD-10-CM

## 2022-07-12 PROCEDURE — 99214 OFFICE O/P EST MOD 30 MIN: CPT | Performed by: INTERNAL MEDICINE

## 2022-07-12 RX ORDER — GABAPENTIN 300 MG/1
300 CAPSULE ORAL
COMMUNITY
Start: 2022-06-27

## 2022-07-12 RX ORDER — ARIPIPRAZOLE 10 MG/1
10 TABLET ORAL DAILY
COMMUNITY
Start: 2022-06-27

## 2022-07-12 NOTE — PATIENT INSTRUCTIONS
[id number] [facility] - Outpatient Follow-up Note  Nelly Washburn 61 y o  female MRN: [id number]  Encounter: [id number]    ASSESSMENT AND PLAN:          Followup Appointment: 6 mo

## 2022-07-12 NOTE — LETTER
July 12, 2022     Holger Harry  [facility]  [email or web address]    Patient: Laura Patrick   YOB: 1965   Date of Visit: 7/12/2022       Dear Dr Viv Suarez: Thank you for referring Linsey Sharma to me for evaluation  Below are my notes for this consultation  If you have questions, please do not hesitate to call me  I look forward to following your patient along with you  Sincerely,        Theodora Reyes MD        CC: No Recipients  Theodora Reyes MD  7/12/2022  6:24 PM  Incomplete  [id number] - Outpatient Follow-up Note  Laura Patrick 61 y o  female MRN: [id number]  Encounter: [id number]    ASSESSMENT AND PLAN:      1  LFT elevation  -- Patient with mild elevation of her transaminases  Her ALT is almost twice the normal which is of some concern  Her liver fibrosis panel is normal however  No significant alcohol intake  Reason ultrasonography shows fatty liver and steatosis  Spleen is not imaged on his last exam   Will check elastography and repeat the liver function studies in 5-6 months    - US elastography; Future  - patient with liver fibrosis panel which showed F0 changes  Mild inflammation noted on the fibrosis panel  Patient with hepatomegaly and steatosis on abdominal ultrasound  Spleen was not visualized  -  Advise repeat LFTs in 6 months  Advise follow-up on lipid profile  Patient should try to lose about 10 lb and engage in exercise program   This will likely be helpful  -  -  Will hold on liver biopsy at this time but follow the patient a little more closely  2  Fatty liver  --  Noted on ultrasound-- please see above  - Hepatic function panel; Future  - Hepatic function panel    3  BMI 31 0-31 9,adult  --  Check lipid profile  - gradual weight loss and exercise program   Weight watchers could be helpful logging exercise activities would be helpful --Fitbit and 10,000 steps per day      4   Bipolar 1 disorder- patient is on psychotropic medications presently Abilify and previously on Seroquel  This may make things more difficult for patient to lose weight      5  History of colon polyp- - patient did have a 10 mm adenoma in the rectum previously in the other smaller polyps in the more proximal colon 2020  These were removed endoscopically  Commonly patient does have a family history of colon cancer     -Patient in for recall at 5 years but probably should move up to 3 years to this would be 2023  Discuss at next visit      Followup Appointment: 6 mo   ______________________________________________________________________    Chief Complaint   Patient presents with    Follow up US and labwork     HPI:   Patient returns to the office for routine follow-up  We have followed the patient past for abnormal liver function studies  Transaminases have been modestly high near 2 times the normal limit of ALT  Patient denies any major issues with abdominal pain  She has no problems with significant fatigue  She has never had viral hepatitis  When she was evaluated in the office 2 years ago for elevated LFT she had lab work which evaluated for possible viral hepatitis, metabolic disease I e  iron and autoimmune hepatitis  All the studies were negative  Patient maintains a BMI over 30  Her lipid profile was somewhat elevated in the past as well  Patient has not been able to lose weight  She has been taking vitamin-E 400 International Units use daily  Most recent lab studies included a liver fibrosis score  This was quite favorable with measure of F0 which indicates low likelihood for significant liver disease or fibrosis  Patient also had a recent abdominal ultrasonography which did show hepatomegaly and steatosis  Spleen was not visualized on this exam   Patient does report she has some discomfort in her left abdomen  She is not too troublesome    She attributes this to some stretching of her abdominal wall when she had twins  Patient is weight has been relatively stable  She has not been able to lose weight  She was previously on Seroquel but this would been switched over to Abilify  Historical Information   Past Medical History:   Diagnosis Date    Anemia     Anxiety     Bipolar 1 disorder ([address] )     Colon polyp     GERD (gastroesophageal reflux disease)     HTN (hypertension)     Hyperlipidemia     Hypertension     Hypothyroidism      Past Surgical History:   Procedure Laterality Date     SECTION       SECTION      HYSTERECTOMY      THYROIDECTOMY      THYROIDECTOMY      TOTAL ABDOMINAL HYSTERECTOMY W/ BILATERAL SALPINGOOPHORECTOMY      UPPER GASTROINTESTINAL ENDOSCOPY       Social History     Substance and Sexual Activity   Alcohol Use Yes    Comment: ocassionally     Social History     Substance and Sexual Activity   Drug Use No     Social History     Tobacco Use   Smoking Status Current Every Day Smoker    Packs/day: 0 25    Years: 20 00    Pack years: 5 00    Types: Cigarettes   Smokeless Tobacco Never Used   Tobacco Comment    3-6 cigarettes per day        Family History   Problem Relation Age of Onset    Hypertension Mother     Ovarian cancer Mother     Hypothyroidism Mother     Hypertension Father     Hyperlipidemia Father     Heart disease Father     Colon cancer Father         Rectal cancer    Rectal cancer Father     Hypertension Sister     Hypertension Brother     Hypertension Daughter     Hypertension Sister     Hypertension Brother     No Known Problems Brother     No Known Problems Brother     Colon polyps Neg Hx          Current Outpatient Medications:     ARIPiprazole (ABILIFY) 5 mg tablet    cholecalciferol (VITAMIN D3) 1,000 units tablet    cyanocobalamin (VITAMIN B-12) 250 MCG tablet    gabapentin (NEURONTIN) 300 mg capsule    hydrochlorothiazide (HYDRODIURIL) 25 mg tablet    levothyroxine (Synthroid) 125 mcg tablet    Loratadine 10 MG CAPS    losartan (COZAAR) 100 MG tablet    mometasone (NASONEX) 50 mcg/act nasal spray    Multiple Vitamin (MULTIVITAMIN) capsule    Omega-3 Fatty Acids (FISH OIL) 1,000 mg    pantoprazole (PROTONIX) 40 mg tablet    Thiamine HCl (VITAMIN B1 PO)    vitamin E 100 UNIT capsule    amitriptyline (ELAVIL) 10 mg tablet    fluorouracil (EFUDEX) 5 % cream  No Known Allergies  Reviewed medications and allergies and updated as indicated    PHYSICAL EXAM:    Blood pressure 132/86, height 5' 5" (1 651 m), weight 86 5 kg (190 lb 9 6 oz)  Body mass index is 31 72 kg/m²  General Appearance: NAD, cooperative, alert  Eyes: Anicteric,  Conjunctiva pink--  ENT:  Normocephalic, atraumatic, normal mucosa  Neck:  Supple, symmetrical, trachea midline  Resp:  Clear to auscultation bilaterally; no rales, rhonchi or wheezing; respirations unlabored   CV:  S1 S2, Regular rate and rhythm; no murmur, rub, or gallop  GI:  Soft, non-tender, non-distended; normal bowel sounds; no organomegaly- no clear hernia noted no significant tenderness or masses   Rectal: Deferred  Musculoskeletal: No cyanosis, clubbing or edema  Normal ROM  Skin:  No jaundice, rashes, or lesions - no  spider angiomata - or palmar erythema  Heme/Lymph: No palpable cervical lymphadenopathy  Psych: Normal affect, good eye contact  Neuro: No gross deficits, AAOx3    Lab Results:   Lab Results   Component Value Date    WBC 5 9 08/11/2020    HGB 13 8 08/11/2020    HCT 42 0 08/11/2020    MCV 90 08/11/2020     08/11/2020     Lab Results   Component Value Date    K 4 0 08/12/2021     08/12/2021    CO2 24 08/12/2021    BUN 13 08/12/2021    CREATININE 0 68 08/12/2021    GLUCOSE 104 01/20/2018    AST 43 (H) 05/26/2022    ALT 82 (H) 05/26/2022    ALT 94 (H) 05/26/2022    EGFR 104 01/20/2018     Lab Results   Component Value Date    IRON 68 03/17/2020    TIBC 280 03/17/2020    FERRITIN 333 (H) 08/11/2020         Karla De La Rosa MD  7/12/2022  2:50 PM  Sign when Signing Visit  [id number] - Outpatient Follow-up Note  Katerina Simental 61 y o  female MRN: [id number]  Encounter: [id number]    ASSESSMENT AND PLAN:      1  LFT elevation  -- Patient with mild elevation of her transaminases  Her ALT is almost twice the normal which is of some concern  Her liver fibrosis panel is normal however  No significant alcohol intake  Reason ultrasonography shows fatty liver and steatosis  Spleen is not imaged on his last exam   Will check elastography and repeat the liver function studies in 5-6 months    - US elastography; Future  - patient with liver fibrosis panel which showed F0 changes  Mild inflammation noted on the fibrosis panel  Patient with hepatomegaly and steatosis on abdominal ultrasound  Spleen was not visualized  -  Advise repeat LFTs in 6 months  Advise follow-up on lipid profile  Patient should try to lose about 10 lb and engage in exercise program   This will likely be helpful  -  -  Will hold on liver biopsy at this time but follow the patient a little more closely  2  Fatty liver  --  Noted on ultrasound-- please see above  - Hepatic function panel; Future  - Hepatic function panel    3   BMI 31 0-31 9,adult  --  Check lipid profile      Followup Appointment: 6 mo   ______________________________________________________________________    Chief Complaint   Patient presents with    Follow up US and labwork     HPI:      Historical Information   Past Medical History:   Diagnosis Date    Anemia     Anxiety     Bipolar 1 disorder ([address] )     Colon polyp     GERD (gastroesophageal reflux disease)     HTN (hypertension)     Hyperlipidemia     Hypertension     Hypothyroidism      Past Surgical History:   Procedure Laterality Date     SECTION       SECTION      HYSTERECTOMY      THYROIDECTOMY      THYROIDECTOMY      TOTAL ABDOMINAL HYSTERECTOMY W/ BILATERAL SALPINGOOPHORECTOMY      UPPER GASTROINTESTINAL ENDOSCOPY       Social History     Substance and Sexual Activity   Alcohol Use Yes    Comment: ocassionally     Social History     Substance and Sexual Activity   Drug Use No     Social History     Tobacco Use   Smoking Status Current Every Day Smoker    Packs/day: 0 25    Years: 20 00    Pack years: 5 00    Types: Cigarettes   Smokeless Tobacco Never Used   Tobacco Comment    3-6 cigarettes per day  Family History   Problem Relation Age of Onset    Hypertension Mother     Ovarian cancer Mother     Hypothyroidism Mother     Hypertension Father     Hyperlipidemia Father     Heart disease Father     Colon cancer Father         Rectal cancer    Rectal cancer Father     Hypertension Sister     Hypertension Brother     Hypertension Daughter     Hypertension Sister     Hypertension Brother     No Known Problems Brother     No Known Problems Brother     Colon polyps Neg Hx          Current Outpatient Medications:     ARIPiprazole (ABILIFY) 5 mg tablet    cholecalciferol (VITAMIN D3) 1,000 units tablet    cyanocobalamin (VITAMIN B-12) 250 MCG tablet    gabapentin (NEURONTIN) 300 mg capsule    hydrochlorothiazide (HYDRODIURIL) 25 mg tablet    levothyroxine (Synthroid) 125 mcg tablet    Loratadine 10 MG CAPS    losartan (COZAAR) 100 MG tablet    mometasone (NASONEX) 50 mcg/act nasal spray    Multiple Vitamin (MULTIVITAMIN) capsule    Omega-3 Fatty Acids (FISH OIL) 1,000 mg    pantoprazole (PROTONIX) 40 mg tablet    Thiamine HCl (VITAMIN B1 PO)    vitamin E 100 UNIT capsule    amitriptyline (ELAVIL) 10 mg tablet    fluorouracil (EFUDEX) 5 % cream  No Known Allergies  Reviewed medications and allergies and updated as indicated    PHYSICAL EXAM:    Blood pressure 132/86, height 5' 5" (1 651 m), weight 86 5 kg (190 lb 9 6 oz)  Body mass index is 31 72 kg/m²    General Appearance: NAD, cooperative, alert  Eyes: Anicteric, PERRLA, EOMI  ENT:  Normocephalic, atraumatic, normal mucosa  Neck:  Supple, symmetrical, trachea midline  Resp:  Clear to auscultation bilaterally; no rales, rhonchi or wheezing; respirations unlabored   CV:  S1 S2, Regular rate and rhythm; no murmur, rub, or gallop  GI:  Soft, non-tender, non-distended; normal bowel sounds; no masses, no organomegaly   Rectal: Deferred  Musculoskeletal: No cyanosis, clubbing or edema  Normal ROM  Skin:  No jaundice, rashes, or lesions   Heme/Lymph: No palpable cervical lymphadenopathy  Psych: Normal affect, good eye contact  Neuro: No gross deficits, AAOx3    Lab Results:   Lab Results   Component Value Date    WBC 5 9 08/11/2020    HGB 13 8 08/11/2020    HCT 42 0 08/11/2020    MCV 90 08/11/2020     08/11/2020     Lab Results   Component Value Date    K 4 0 08/12/2021     08/12/2021    CO2 24 08/12/2021    BUN 13 08/12/2021    CREATININE 0 68 08/12/2021    GLUCOSE 104 01/20/2018    AST 43 (H) 05/26/2022    ALT 82 (H) 05/26/2022    ALT 94 (H) 05/26/2022    EGFR 104 01/20/2018     Lab Results   Component Value Date    IRON 68 03/17/2020    TIBC 280 03/17/2020    FERRITIN 333 (H) 08/11/2020     No results found for: LIPASE    Radiology Results:   No results found

## 2022-07-12 NOTE — PROGRESS NOTES
[id number] - Outpatient Follow-up Note  Carlos Hdz 61 y o  female MRN: [id number]  Encounter: [id number]    ASSESSMENT AND PLAN:      1  LFT elevation  -- Patient with mild elevation of her transaminases  Her ALT is almost twice the normal which is of some concern  Her liver fibrosis panel is normal however  No significant alcohol intake  Reason ultrasonography shows fatty liver and steatosis  Spleen is not imaged on his last exam   Will check elastography and repeat the liver function studies in 5-6 months    - US elastography; Future  - patient with liver fibrosis panel which showed F0 changes  Mild inflammation noted on the fibrosis panel  Patient with hepatomegaly and steatosis on abdominal ultrasound  Spleen was not visualized  -  Advise repeat LFTs in 6 months  Advise follow-up on lipid profile  Patient should try to lose about 10 lb and engage in exercise program   This will likely be helpful  -  -  Will hold on liver biopsy at this time but follow the patient a little more closely  2  Fatty liver  --  Noted on ultrasound-- please see above  - Hepatic function panel; Future  - Hepatic function panel    3  BMI 31 0-31 9,adult  --  Check lipid profile  - gradual weight loss and exercise program   Weight watchers could be helpful logging exercise activities would be helpful --Fitbit and 10,000 steps per day      4  Bipolar 1 disorder- patient is on psychotropic medications presently Abilify and previously on Seroquel  This may make things more difficult for patient to lose weight      5  History of colon polyp- - patient did have a 10 mm adenoma in the rectum previously in the other smaller polyps in the more proximal colon 2020  These were removed endoscopically  Commonly patient does have a family history of colon cancer     -Patient in for recall at 5 years but probably should move up to 3 years to this would be 2023    Discuss at next visit      Followup Appointment: 6 mo   ______________________________________________________________________    Chief Complaint   Patient presents with    Follow up [address]     HPI:   Patient returns to the office for routine follow-up  We have followed the patient past for abnormal liver function studies  Transaminases have been modestly high near 2 times the normal limit of ALT  Patient denies any major issues with abdominal pain  She has no problems with significant fatigue  She has never had viral hepatitis  When she was evaluated in the office 2 years ago for elevated LFT she had lab work which evaluated for possible viral hepatitis, metabolic disease I e  iron and autoimmune hepatitis  All the studies were negative  Patient maintains a BMI over 30  Her lipid profile was somewhat elevated in the past as well  Patient has not been able to lose weight  She has been taking vitamin-E 400 International Units use daily  Most recent lab studies included a liver fibrosis score  This was quite favorable with measure of F0 which indicates low likelihood for significant liver disease or fibrosis  Patient also had a recent abdominal ultrasonography which did show hepatomegaly and steatosis  Spleen was not visualized on this exam   Patient does report she has some discomfort in her left abdomen  She is not too troublesome  She attributes this to some stretching of her abdominal wall when she had twins  Patient is weight has been relatively stable  She has not been able to lose weight  She was previously on Seroquel but this would been switched over to Abilify        Historical Information   Past Medical History:   Diagnosis Date    Anemia     Anxiety     Bipolar 1 disorder (HCC)     Colon polyp     GERD (gastroesophageal reflux disease)     HTN (hypertension)     Hyperlipidemia     Hypertension     Hypothyroidism      Past Surgical History:   Procedure Laterality Date     SECTION       SECTION      HYSTERECTOMY      THYROIDECTOMY      THYROIDECTOMY      TOTAL ABDOMINAL HYSTERECTOMY W/ BILATERAL SALPINGOOPHORECTOMY      UPPER GASTROINTESTINAL ENDOSCOPY       Social History     Substance and Sexual Activity   Alcohol Use Yes    Comment: ocassionally     Social History     Substance and Sexual Activity   Drug Use No     Social History     Tobacco Use   Smoking Status Current Every Day Smoker    Packs/day: 0 25    Years: 20 00    Pack years: 5 00    Types: Cigarettes   Smokeless Tobacco Never Used   Tobacco Comment    3-6 cigarettes per day        Family History   Problem Relation Age of Onset    Hypertension Mother     Ovarian cancer Mother     Hypothyroidism Mother     Hypertension Father     Hyperlipidemia Father     Heart disease Father     Colon cancer Father         Rectal cancer    Rectal cancer Father     Hypertension Sister     Hypertension Brother     Hypertension Daughter     Hypertension Sister     Hypertension Brother     No Known Problems Brother     No Known Problems Brother     Colon polyps Neg Hx          Current Outpatient Medications:     ARIPiprazole (ABILIFY) 5 mg tablet    cholecalciferol (VITAMIN D3) 1,000 units tablet    cyanocobalamin (VITAMIN B-12) 250 MCG tablet    gabapentin (NEURONTIN) 300 mg capsule    hydrochlorothiazide (HYDRODIURIL) 25 mg tablet    levothyroxine (Synthroid) 125 mcg tablet    Loratadine 10 MG CAPS    losartan (COZAAR) 100 MG tablet    mometasone (NASONEX) 50 mcg/act nasal spray    Multiple Vitamin (MULTIVITAMIN) capsule    Omega-3 Fatty Acids (FISH OIL) 1,000 mg    pantoprazole (PROTONIX) 40 mg tablet    Thiamine HCl (VITAMIN B1 PO)    vitamin E 100 UNIT capsule    amitriptyline (ELAVIL) 10 mg tablet    fluorouracil (EFUDEX) 5 % cream  No Known Allergies  Reviewed medications and allergies and updated as indicated    PHYSICAL EXAM:    Blood pressure 132/86, height 5' 5" (1 651 m), weight 86 5 kg (190 lb 9 6 oz)  Body mass index is 31 72 kg/m²  General Appearance: NAD, cooperative, alert  Eyes: Anicteric,  Conjunctiva pink--  ENT:  Normocephalic, atraumatic, normal mucosa  Neck:  Supple, symmetrical, trachea midline  Resp:  Clear to auscultation bilaterally; no rales, rhonchi or wheezing; respirations unlabored   CV:  S1 S2, Regular rate and rhythm; no murmur, rub, or gallop  GI:  Soft, non-tender, non-distended; normal bowel sounds; no organomegaly- no clear hernia noted no significant tenderness or masses   Rectal: Deferred  Musculoskeletal: No cyanosis, clubbing or edema  Normal ROM  Skin:  No jaundice, rashes, or lesions - no  spider angiomata - or palmar erythema  Heme/Lymph: No palpable cervical lymphadenopathy  Psych: Normal affect, good eye contact  Neuro: No gross deficits, AAOx3    Lab Results:   Lab Results   Component Value Date    WBC 5 9 08/11/2020    HGB 13 8 08/11/2020    HCT 42 0 08/11/2020    MCV 90 08/11/2020     08/11/2020     Lab Results   Component Value Date    K 4 0 08/12/2021     08/12/2021    CO2 24 08/12/2021    BUN 13 08/12/2021    CREATININE 0 68 08/12/2021    GLUCOSE 104 01/20/2018    AST 43 (H) 05/26/2022    ALT 82 (H) 05/26/2022    ALT 94 (H) 05/26/2022    EGFR 104 01/20/2018     Lab Results   Component Value Date    IRON 68 03/17/2020    TIBC 280 03/17/2020    FERRITIN 333 (H) 08/11/2020

## 2022-08-23 ENCOUNTER — TELEPHONE (OUTPATIENT)
Dept: OTHER | Facility: OTHER | Age: 57
End: 2022-08-23

## 2022-08-23 NOTE — TELEPHONE ENCOUNTER
Pt calling and had some questions  Stated wanted to know if she ever had test run for MS  Please call patient back to discuss

## 2022-08-24 NOTE — TELEPHONE ENCOUNTER
Called pt back, her daughter had an abnormal alpha 1 antitrypsin level (deficient) and typed as MS  Pt questioned if she ever had this done  Reviewed pt's lab results and noted she did have it done 8/11/2020  Pt is questioning if Dr Robert Carlson would be willing to reorder this for her?  ([facility])

## 2022-08-26 LAB
25(OH)D3+25(OH)D2 SERPL-MCNC: 44.1 NG/ML (ref 30–100)
ALBUMIN SERPL-MCNC: 4.5 G/DL (ref 3.8–4.9)
ALBUMIN/GLOB SERPL: 2 {RATIO} (ref 1.2–2.2)
ALP SERPL-CCNC: 77 IU/L (ref 44–121)
ALT SERPL-CCNC: 115 IU/L (ref 0–32)
AST SERPL-CCNC: 57 IU/L (ref 0–40)
BILIRUB SERPL-MCNC: 0.5 MG/DL (ref 0–1.2)
BUN SERPL-MCNC: 14 MG/DL (ref 6–24)
BUN/CREAT SERPL: 18 (ref 9–23)
CALCIUM SERPL-MCNC: 9.2 MG/DL (ref 8.7–10.2)
CHLORIDE SERPL-SCNC: 107 MMOL/L (ref 96–106)
CHOLEST SERPL-MCNC: 210 MG/DL (ref 100–199)
CO2 SERPL-SCNC: 23 MMOL/L (ref 20–29)
CREAT SERPL-MCNC: 0.76 MG/DL (ref 0.57–1)
EGFR: 91 ML/MIN/1.73
EST. AVERAGE GLUCOSE BLD GHB EST-MCNC: 128 MG/DL
GLOBULIN SER-MCNC: 2.2 G/DL (ref 1.5–4.5)
GLUCOSE SERPL-MCNC: 117 MG/DL (ref 65–99)
HBA1C MFR BLD: 6.1 % (ref 4.8–5.6)
HDLC SERPL-MCNC: 38 MG/DL
LDLC SERPL CALC-MCNC: 141 MG/DL (ref 0–99)
LDLC/HDLC SERPL: 3.7 RATIO (ref 0–3.2)
POTASSIUM SERPL-SCNC: 3.9 MMOL/L (ref 3.5–5.2)
PROT SERPL-MCNC: 6.7 G/DL (ref 6–8.5)
SL AMB VLDL CHOLESTEROL CALC: 31 MG/DL (ref 5–40)
SODIUM SERPL-SCNC: 144 MMOL/L (ref 134–144)
T4 FREE SERPL-MCNC: 1.77 NG/DL (ref 0.82–1.77)
TRIGL SERPL-MCNC: 169 MG/DL (ref 0–149)
TSH SERPL DL<=0.005 MIU/L-ACNC: 2.37 UIU/ML (ref 0.45–4.5)

## 2022-08-30 ENCOUNTER — OFFICE VISIT (OUTPATIENT)
Dept: ENDOCRINOLOGY | Facility: HOSPITAL | Age: 57
End: 2022-08-30
Payer: COMMERCIAL

## 2022-08-30 VITALS
HEIGHT: 65 IN | SYSTOLIC BLOOD PRESSURE: 186 MMHG | WEIGHT: 185 LBS | BODY MASS INDEX: 30.82 KG/M2 | DIASTOLIC BLOOD PRESSURE: 84 MMHG | HEART RATE: 92 BPM

## 2022-08-30 DIAGNOSIS — E55.9 VITAMIN D DEFICIENCY: ICD-10-CM

## 2022-08-30 DIAGNOSIS — E78.5 HYPERLIPIDEMIA, UNSPECIFIED HYPERLIPIDEMIA TYPE: ICD-10-CM

## 2022-08-30 DIAGNOSIS — E89.0 POSTOPERATIVE HYPOTHYROIDISM: Primary | ICD-10-CM

## 2022-08-30 DIAGNOSIS — R73.03 PREDIABETES: ICD-10-CM

## 2022-08-30 PROCEDURE — 99214 OFFICE O/P EST MOD 30 MIN: CPT | Performed by: NURSE PRACTITIONER

## 2022-08-30 RX ORDER — GABAPENTIN 400 MG/1
400 CAPSULE ORAL
COMMUNITY
Start: 2022-07-21

## 2022-08-30 NOTE — PATIENT INSTRUCTIONS
Be mindful of diet  Stay active and stay hydrated  For now, continue Synthroid at current dose  Complete lab work as prescribed  Contact the office with any change in symptoms  Discuss cholesterol finding with PCP, given ongoing testing through GI  Your blood pressure was elevated in the office today  Continue Losartan and check your blood pressures at home

## 2022-08-30 NOTE — PROGRESS NOTES
Ghotra Hem 61 y o  female MRN: [id number]    Encounter: [id number]      Assessment/Plan     Assessment: This is a 61y o -year-old female with female with postoperative hypothyroidism and prediabetes  Plan:  1  Hypothyroidism postoperative:  Most recent TSH and free T4 are normal    Continue current regimen for now  Check TSH and free T4 prior to next office visit    2  Prediabetes:  Most recent hemoglobin A1c is 6 1  Encourage diet, exercise and lifestyle modification   Check CMP, A1c, lipid panel before next appointment which will be in 1 year     3  Vitamin-D deficiency:  Continue supplementation  4  Hyperlipidemia:  Discussed the impact of a proper diet and exercise and the need for medication  Considering her ongoing liver testing  She will discuss with her PCP  5  Hypertension:  Reassessed to 158/64 at the end of the visit  Continue Losartan  And take blood pressures at home for surveillance  CC:  Hypothyroidism/Pre-diabetes follow-up    History of Present Illness     HPI:  46 y  o  year old female with history of hypothyroidism after surgery, prediabetes who presents for a follow-up appointment   In 2010, she underwent surgery for nodules and fortunately pathology came back benign   She is treated with Synthroid brand 125 mcg daily with no tablet on Sunday  Her most recent TSH from August 5, 2022 is 2 370 with free T4 of 1 77  No neck compressive symptoms  Chirag Gamez presents today overall feeling well       For her prediabetes, her most recent hemoglobin A1c is 6 1 from August 4, 2020 with a fasting blood sugar of 117      For her vitamin-D deficiency, she supplements with 2000 units of vitamin D3 daily  Most recent 25 hydroxy vitamin-D level is 44 1  Review of Systems   Constitutional: Negative  Negative for chills, fatigue and fever  HENT: Negative  Negative for trouble swallowing and voice change  Eyes: Negative  Negative for visual disturbance  Respiratory: Negative  Negative for chest tightness and shortness of breath  Cardiovascular: Negative  Negative for chest pain  Gastrointestinal: Negative  Negative for abdominal pain, constipation, diarrhea and vomiting  Endocrine: Negative for cold intolerance, heat intolerance, polydipsia, polyphagia and polyuria  Genitourinary: Negative  Musculoskeletal: Negative  Skin: Negative  Allergic/Immunologic: Negative  Neurological: Negative  Negative for dizziness, syncope, light-headedness and headaches  Hematological: Negative  Psychiatric/Behavioral: Negative  All other systems reviewed and are negative  Historical Information   Past Medical History:   Diagnosis Date    Anemia     Anxiety     Bipolar 1 disorder ([facility] [address] )     Colon polyp     GERD (gastroesophageal reflux disease)     HTN (hypertension)     Hyperlipidemia     Hypertension     Hypothyroidism      Past Surgical History:   Procedure Laterality Date     SECTION       SECTION      HYSTERECTOMY      THYROIDECTOMY      THYROIDECTOMY      TOTAL ABDOMINAL HYSTERECTOMY W/ BILATERAL SALPINGOOPHORECTOMY      UPPER GASTROINTESTINAL ENDOSCOPY       Social History   Social History     Substance and Sexual Activity   Alcohol Use Yes    Comment: ocassionally     Social History     Substance and Sexual Activity   Drug Use No     Social History     Tobacco Use   Smoking Status Current Every Day Smoker    Packs/day: 0 25    Years: 20 00    Pack years: 5 00    Types: Cigarettes   Smokeless Tobacco Never Used   Tobacco Comment    3-6 cigarettes per day        Family History:   Family History   Problem Relation Age of Onset    Hypertension Mother     Ovarian cancer Mother     Hypothyroidism Mother     Hypertension Father     Hyperlipidemia Father     Heart disease Father     Colon cancer Father         Rectal cancer    Rectal cancer Father     Hypertension Sister     Hypertension Brother     Hypertension Daughter    Jorge Montero Hypertension Sister     Hypertension Brother     No Known Problems Brother     No Known Problems Brother     Colon polyps Neg Hx        Meds/Allergies   Current Outpatient Medications   Medication Sig Dispense Refill    ARIPiprazole (ABILIFY) 10 mg tablet Take 10 mg by mouth daily      cholecalciferol (VITAMIN D3) 1,000 units tablet Take 2,000 Units by mouth daily      cyanocobalamin (VITAMIN B-12) 250 MCG tablet Take 500 mcg by mouth daily      gabapentin (NEURONTIN) 400 mg capsule Take 400 mg by mouth daily at bedtime      hydrochlorothiazide (HYDRODIURIL) 25 mg tablet Take 25 mg by mouth daily      levothyroxine (Synthroid) 125 mcg tablet TAKE 1 TABLET 6 days of the week, 1 5 tabs sundays 32 tablet 11    Loratadine 10 MG CAPS Take 1 capsule by mouth as needed       losartan (COZAAR) 100 MG tablet Take 100 mg by mouth daily      mometasone (NASONEX) 50 mcg/act nasal spray 2 sprays into each nostril daily      Multiple Vitamin (MULTIVITAMIN) capsule Take 1 capsule by mouth daily      Omega-3 Fatty Acids (FISH OIL) 1,000 mg Take 1,000 mg by mouth daily      pantoprazole (PROTONIX) 40 mg tablet TAKE 1 TABLET BY MOUTH EVERY DAY 30 tablet 3    Thiamine HCl (VITAMIN B1 PO) Take 1,000 mg by mouth daily      vitamin E 100 UNIT capsule Take 100 Units by mouth daily      amitriptyline (ELAVIL) 10 mg tablet Take 5 mg by mouth daily at bedtime  (Patient not taking: No sig reported)      fluorouracil (EFUDEX) 5 % cream APPLY CREAM TOPICALLY TO AFFECTED AREA TWICE DAILY FOR 2 4 WEEKS AS TOLERATED (Patient not taking: No sig reported)  0    gabapentin (NEURONTIN) 300 mg capsule Take 300 mg by mouth daily at bedtime (Patient not taking: Reported on 8/30/2022)       No current facility-administered medications for this visit  No Known Allergies    Objective   Vitals: Blood pressure (!) 186/84, pulse 92, height 5' 5" (1 651 m), weight 83 9 kg (185 lb)  Physical Exam  Vitals reviewed     Constitutional: Appearance: She is well-developed  HENT:      Head: Normocephalic and atraumatic  Eyes:      Conjunctiva/sclera: Conjunctivae normal       Pupils: Pupils are equal, round, and reactive to light  Cardiovascular:      Rate and Rhythm: Normal rate and regular rhythm  Heart sounds: Normal heart sounds  Pulmonary:      Effort: Pulmonary effort is normal       Breath sounds: Normal breath sounds  Abdominal:      General: Bowel sounds are normal       Palpations: Abdomen is soft  Musculoskeletal:         General: Normal range of motion  Cervical back: Normal range of motion and neck supple  Skin:     General: Skin is warm and dry  Neurological:      Mental Status: She is alert and oriented to person, place, and time  Psychiatric:         Behavior: Behavior normal          Thought Content: Thought content normal          Judgment: Judgment normal          Lab Results:   Lab Results   Component Value Date/Time    Free t4 1 77 08/25/2022 07:40 AM    Free t4 1 16 05/26/2022 12:07 PM     Portions of the record may have been created with voice recognition software  Occasional wrong word or "sound a like" substitutions may have occurred due to the inherent limitations of voice recognition software  Read the chart carefully and recognize, using context, where substitutions have occurred

## 2022-08-31 ENCOUNTER — TELEPHONE (OUTPATIENT)
Dept: ADMINISTRATIVE | Facility: OTHER | Age: 57
End: 2022-08-31

## 2022-08-31 NOTE — LETTER
Procedure Request Form: Colonoscopy      Date Requested: 22  Patient: Refugia Leah  Patient : 1965   Referring Provider: Ellan Sensor, DO        Date of Procedure ______________________________       The above patient has informed us that they have completed their   most recent Colonoscopy at your facility  Please complete   this form and attach all corresponding procedure reports/results  Comments __________________________________________________________  ____________________________________________________________________  ____________________________________________________________________  ____________________________________________________________________    Facility Completing Procedure _________________________________________    Form Completed By (print name) _______________________________________      Signature __________________________________________________________      These reports are needed for  compliance  Please fax this completed form and a copy of the procedure report to our office located at [place] [address] as soon as possible to [phone] attention Ned Covert: Phone [phone]    We thank you for your assistance in treating our mutual patient

## 2022-08-31 NOTE — TELEPHONE ENCOUNTER
Upon review of the In Basket request and the patient's chart, initial outreach has been made via fax, please see Contacts section for details       Thank you  Srinath Mata

## 2022-08-31 NOTE — TELEPHONE ENCOUNTER
----- Message from [facility] sent at 8/30/2022  1:30 PM EDT -----  Regarding: CRC  08/30/22 1:30 PM    Hello, our patient Lucinda Azevedo has had a Colonoscopy performed within the last 10 years at [facility]  Their number is [phone]      Thank you,  [address] [place] [address] [facility] Andrei Yoon

## 2022-09-08 NOTE — TELEPHONE ENCOUNTER
Upon review of the In Basket request we were able to locate, review, and update the patient chart as requested for CRC: Colonoscopy  Any additional questions or concerns should be emailed to the Practice Liaisons via [email or web address] email, please do not reply via In Basket      Thank you  Jil Skaggs

## 2022-12-13 ENCOUNTER — HOSPITAL ENCOUNTER (EMERGENCY)
Facility: HOSPITAL | Age: 57
Discharge: HOME | End: 2022-12-13
Attending: EMERGENCY MEDICINE

## 2022-12-13 ENCOUNTER — APPOINTMENT (EMERGENCY)
Dept: CT IMAGING | Facility: HOSPITAL | Age: 57
End: 2022-12-13

## 2022-12-13 VITALS
TEMPERATURE: 97.5 F | SYSTOLIC BLOOD PRESSURE: 158 MMHG | RESPIRATION RATE: 19 BRPM | OXYGEN SATURATION: 96 % | BODY MASS INDEX: 27.82 KG/M2 | DIASTOLIC BLOOD PRESSURE: 72 MMHG | HEIGHT: 65 IN | WEIGHT: 167 LBS | HEART RATE: 72 BPM

## 2022-12-13 DIAGNOSIS — J06.9 URI (UPPER RESPIRATORY INFECTION): ICD-10-CM

## 2022-12-13 DIAGNOSIS — E87.6 HYPOKALEMIA: ICD-10-CM

## 2022-12-13 DIAGNOSIS — R51.9 HEADACHE: Primary | ICD-10-CM

## 2022-12-13 PROBLEM — R29.90 STROKE-LIKE SYMPTOMS: Status: ACTIVE | Noted: 2022-12-13

## 2022-12-13 PROBLEM — I10 ESSENTIAL HYPERTENSION: Status: ACTIVE | Noted: 2022-12-13

## 2022-12-13 LAB
2HR DELTA HS TROPONIN: 0 NG/L
ALBUMIN SERPL BCP-MCNC: 4.6 G/DL (ref 3.5–5)
ALP SERPL-CCNC: 92 U/L (ref 46–116)
ALT SERPL W P-5'-P-CCNC: 41 U/L (ref 12–78)
ANION GAP SERPL CALCULATED.3IONS-SCNC: 13 MMOL/L (ref 4–13)
APTT PPP: 23 SECONDS (ref 23–37)
AST SERPL W P-5'-P-CCNC: 26 U/L (ref 5–45)
BACTERIA UR QL AUTO: NORMAL /HPF
BASOPHILS # BLD AUTO: 0.04 THOUSANDS/ÂΜL (ref 0–0.1)
BASOPHILS NFR BLD AUTO: 1 % (ref 0–1)
BILIRUB SERPL-MCNC: 0.5 MG/DL (ref 0.2–1)
BILIRUB UR QL STRIP: NEGATIVE
BUN SERPL-MCNC: 14 MG/DL (ref 5–25)
CALCIUM SERPL-MCNC: 9.9 MG/DL (ref 8.3–10.1)
CARDIAC TROPONIN I PNL SERPL HS: 6 NG/L
CARDIAC TROPONIN I PNL SERPL HS: 6 NG/L
CHLORIDE SERPL-SCNC: 100 MMOL/L (ref 96–108)
CLARITY UR: CLEAR
CO2 SERPL-SCNC: 27 MMOL/L (ref 21–32)
COLOR UR: YELLOW
CREAT SERPL-MCNC: 0.78 MG/DL (ref 0.6–1.3)
EOSINOPHIL # BLD AUTO: 0.13 THOUSAND/ÂΜL (ref 0–0.61)
EOSINOPHIL NFR BLD AUTO: 2 % (ref 0–6)
ERYTHROCYTE [DISTWIDTH] IN BLOOD BY AUTOMATED COUNT: 12.7 % (ref 11.6–15.1)
FLUAV RNA RESP QL NAA+PROBE: NEGATIVE
FLUBV RNA RESP QL NAA+PROBE: NEGATIVE
GFR SERPL CREATININE-BSD FRML MDRD: 84 ML/MIN/1.73SQ M
GLUCOSE SERPL-MCNC: 128 MG/DL (ref 65–140)
GLUCOSE UR STRIP-MCNC: NEGATIVE MG/DL
HCT VFR BLD AUTO: 45.1 % (ref 34.8–46.1)
HGB BLD-MCNC: 15.3 G/DL (ref 11.5–15.4)
HGB UR QL STRIP.AUTO: NEGATIVE
IMM GRANULOCYTES # BLD AUTO: 0.03 THOUSAND/UL (ref 0–0.2)
IMM GRANULOCYTES NFR BLD AUTO: 0 % (ref 0–2)
INR PPP: 0.91 (ref 0.84–1.19)
KETONES UR STRIP-MCNC: NEGATIVE MG/DL
LEUKOCYTE ESTERASE UR QL STRIP: ABNORMAL
LYMPHOCYTES # BLD AUTO: 2.39 THOUSANDS/ÂΜL (ref 0.6–4.47)
LYMPHOCYTES NFR BLD AUTO: 27 % (ref 14–44)
MAGNESIUM SERPL-MCNC: 2.1 MG/DL (ref 1.6–2.6)
MCH RBC QN AUTO: 30.5 PG (ref 26.8–34.3)
MCHC RBC AUTO-ENTMCNC: 33.9 G/DL (ref 31.4–37.4)
MCV RBC AUTO: 90 FL (ref 82–98)
MONOCYTES # BLD AUTO: 0.64 THOUSAND/ÂΜL (ref 0.17–1.22)
MONOCYTES NFR BLD AUTO: 7 % (ref 4–12)
MUCOUS THREADS UR QL AUTO: NORMAL
NEUTROPHILS # BLD AUTO: 5.59 THOUSANDS/ÂΜL (ref 1.85–7.62)
NEUTS SEG NFR BLD AUTO: 63 % (ref 43–75)
NITRITE UR QL STRIP: NEGATIVE
NON-SQ EPI CELLS URNS QL MICRO: NORMAL /HPF
NRBC BLD AUTO-RTO: 0 /100 WBCS
PH UR STRIP.AUTO: 6.5 [PH]
PLATELET # BLD AUTO: 197 THOUSANDS/UL (ref 149–390)
PMV BLD AUTO: 11.8 FL (ref 8.9–12.7)
POTASSIUM SERPL-SCNC: 3.4 MMOL/L (ref 3.5–5.3)
PROT SERPL-MCNC: 8.3 G/DL (ref 6.4–8.4)
PROT UR STRIP-MCNC: NEGATIVE MG/DL
PROTHROMBIN TIME: 12.9 SECONDS (ref 11.6–14.5)
RBC # BLD AUTO: 5.01 MILLION/UL (ref 3.81–5.12)
RBC #/AREA URNS AUTO: NORMAL /HPF
RSV RNA RESP QL NAA+PROBE: NEGATIVE
SARS-COV-2 RNA RESP QL NAA+PROBE: NEGATIVE
SODIUM SERPL-SCNC: 140 MMOL/L (ref 135–147)
SP GR UR STRIP.AUTO: 1.01 (ref 1–1.03)
UROBILINOGEN UR STRIP-ACNC: <2 MG/DL
WBC # BLD AUTO: 8.82 THOUSAND/UL (ref 4.31–10.16)
WBC #/AREA URNS AUTO: NORMAL /HPF

## 2022-12-13 RX ORDER — KETOROLAC TROMETHAMINE 30 MG/ML
15 INJECTION, SOLUTION INTRAMUSCULAR; INTRAVENOUS ONCE
Status: COMPLETED | OUTPATIENT
Start: 2022-12-13 | End: 2022-12-13

## 2022-12-13 RX ORDER — ACETAMINOPHEN 325 MG/1
650 TABLET ORAL ONCE
Status: COMPLETED | OUTPATIENT
Start: 2022-12-13 | End: 2022-12-13

## 2022-12-13 RX ORDER — POTASSIUM CHLORIDE 20 MEQ/1
20 TABLET, EXTENDED RELEASE ORAL ONCE
Status: COMPLETED | OUTPATIENT
Start: 2022-12-13 | End: 2022-12-13

## 2022-12-13 RX ORDER — ONDANSETRON 2 MG/ML
4 INJECTION INTRAMUSCULAR; INTRAVENOUS ONCE
Status: DISCONTINUED | OUTPATIENT
Start: 2022-12-13 | End: 2022-12-13 | Stop reason: HOSPADM

## 2022-12-13 RX ORDER — ONDANSETRON 4 MG/1
4 TABLET, ORALLY DISINTEGRATING ORAL EVERY 6 HOURS PRN
Qty: 20 TABLET | Refills: 0 | Status: SHIPPED | OUTPATIENT
Start: 2022-12-13

## 2022-12-13 RX ADMIN — POTASSIUM CHLORIDE 20 MEQ: 1500 TABLET, EXTENDED RELEASE ORAL at 06:00

## 2022-12-13 RX ADMIN — ACETAMINOPHEN 650 MG: 325 TABLET ORAL at 06:00

## 2022-12-13 RX ADMIN — KETOROLAC TROMETHAMINE 15 MG: 30 INJECTION, SOLUTION INTRAMUSCULAR; INTRAVENOUS at 04:41

## 2022-12-13 RX ADMIN — SODIUM CHLORIDE 1000 ML: 0.9 INJECTION, SOLUTION INTRAVENOUS at 03:30

## 2022-12-13 NOTE — ED NOTES
Pt ambulated reports dizziness   reports that this is a chronic issue since September   Provider aware      Imtiaz Cardona, RN  12/13/22 [id number]

## 2022-12-13 NOTE — ASSESSMENT & PLAN NOTE
· Presented due to headache x 3 days, blurry vision that occurred morning of 12/12, and lightheadedness  Denies weakness, numbness or dyshaphia   · Being worked up outpatient for frontotemporal dementia vs MS at [facility]   · CT head   · No acute intracranial hemorrhage  · Multifocal areas of periventricular and subcortical white matter hypoattenuation  In the absence of comparison studies, stability cannot be assessed  Findings could be secondary to microangiopathic change  If there is concern for acute infarction, MRI should be performed    · Neuro checks   · Obtain MRI brain   · ECHO   · Telemetry  · Consult Neuro, PT/OT and CM

## 2022-12-13 NOTE — ED PROVIDER NOTES
History  Chief Complaint   Patient presents with   • Headache     60 yo F w/ PMH of bipolar, anemia, anxiety, HTN, HLD, and recent dx of possible frontotemporal dementia vs MS over the past year (follows at [facility]) presents to ED with about 3 days of generalized HA  No trauma  This morning, noticed some B/L blurry vision, but no eye pain, redness, or loss of vision/double vision  Feels lightheaded  Isn't drinking/eating much  Decreased urination  Mild nausea  No vomiting  Has some congestion and sinus pressure  Mild cough  Pain 8/10  No cp/sob or abd pain  No slurred speech/facial droop/focal numbness/tingling/weakness  Tried tylenol at home w/out relief  No sick contacts or travel  Is covid/flu vaccinated  History provided by:  Patient, medical records and spouse   used: No    Headache - New Onset or New Symptoms  Pain location:  Generalized  Quality:  Dull  Radiates to:  Does not radiate  Severity currently:  8/10  Severity at highest:  8/10  Onset quality:  Gradual  Progression:  Unchanged  Chronicity:  New  Similar to prior headaches: yes    Relieved by:  Nothing  Worsened by:  Nothing  Ineffective treatments:  Acetaminophen  Associated symptoms: blurred vision, congestion, fatigue, nausea, sinus pressure and URI    Associated symptoms: no abdominal pain, no back pain, no cough, no diarrhea, no dizziness, no drainage, no ear pain, no eye pain, no facial pain, no fever, no focal weakness, no hearing loss, no loss of balance, no myalgias, no near-syncope, no neck pain, no neck stiffness, no numbness, no paresthesias, no photophobia, no seizures, no sore throat, no swollen glands, no syncope, no tingling, no visual change, no vomiting and no weakness        Prior to Admission Medications   Prescriptions Last Dose Informant Patient Reported? Taking?    ARIPiprazole (ABILIFY) 10 mg tablet   Yes No   Sig: Take 10 mg by mouth daily   Loratadine 10 MG CAPS   Yes No   Sig: Take 1 capsule by mouth as needed    Multiple Vitamin (MULTIVITAMIN) capsule   Yes No   Sig: Take 1 capsule by mouth daily   Omega-3 Fatty Acids (FISH OIL) 1,000 mg   Yes No   Sig: Take 1,000 mg by mouth daily   Thiamine HCl (VITAMIN B1 PO)   Yes No   Sig: Take 1,000 mg by mouth daily   amitriptyline (ELAVIL) 10 mg tablet   Yes No   Sig: Take 5 mg by mouth daily at bedtime    Patient not taking: No sig reported   cholecalciferol (VITAMIN D3) 1,000 units tablet   Yes No   Sig: Take 2,000 Units by mouth daily   cyanocobalamin (VITAMIN B-12) 250 MCG tablet   Yes No   Sig: Take 500 mcg by mouth daily   fluorouracil (EFUDEX) 5 % cream   Yes No   Sig: APPLY CREAM TOPICALLY TO AFFECTED AREA TWICE DAILY FOR 2 4 WEEKS AS TOLERATED   Patient not taking: No sig reported   gabapentin (NEURONTIN) 300 mg capsule   Yes No   Sig: Take 300 mg by mouth daily at bedtime   Patient not taking: Reported on 2022   gabapentin (NEURONTIN) 400 mg capsule   Yes No   Sig: Take 400 mg by mouth daily at bedtime   hydrochlorothiazide (HYDRODIURIL) 25 mg tablet   Yes No   Sig: Take 25 mg by mouth daily   levothyroxine (Synthroid) 125 mcg tablet   No No   Sig: TAKE 1 TABLET 6 days of the week, 1 5 tabs sundays   losartan (COZAAR) 100 MG tablet   Yes No   Sig: Take 100 mg by mouth daily   mometasone (NASONEX) 50 mcg/act nasal spray   Yes No   Si sprays into each nostril daily   pantoprazole (PROTONIX) 40 mg tablet   No No   Sig: TAKE 1 TABLET BY MOUTH EVERY DAY   vitamin E 100 UNIT capsule   Yes No   Sig: Take 100 Units by mouth daily      Facility-Administered Medications: None       Past Medical History:   Diagnosis Date   • Anemia    • Anxiety    • Bipolar 1 disorder (HCC)    • Colon polyp    • GERD (gastroesophageal reflux disease)    • HTN (hypertension)    • Hyperlipidemia    • Hypertension    • Hypothyroidism        Past Surgical History:   Procedure Laterality Date   •  SECTION     •  SECTION     • HYSTERECTOMY     • THYROIDECTOMY     • THYROIDECTOMY     • TOTAL ABDOMINAL HYSTERECTOMY W/ BILATERAL SALPINGOOPHORECTOMY     • UPPER GASTROINTESTINAL ENDOSCOPY         Family History   Problem Relation Age of Onset   • Hypertension Mother    • Ovarian cancer Mother    • Hypothyroidism Mother    • Hypertension Father    • Hyperlipidemia Father    • Heart disease Father    • Colon cancer Father         Rectal cancer   • Rectal cancer Father    • Hypertension Sister    • Hypertension Brother    • Hypertension Daughter    • Hypertension Sister    • Hypertension Brother    • No Known Problems Brother    • No Known Problems Brother    • Colon polyps Neg Hx      I have reviewed and agree with the history as documented  E-Cigarette/Vaping   • E-Cigarette Use Never User      E-Cigarette/Vaping Substances   • Nicotine No    • THC No    • CBD No    • Flavoring No    • Other No    • Unknown No      Social History     Tobacco Use   • Smoking status: Every Day     Packs/day: 0 25     Years: 20 00     Pack years: 5 00     Types: Cigarettes   • Smokeless tobacco: Never   • Tobacco comments:     3-6 cigarettes per day  Vaping Use   • Vaping Use: Never used   Substance Use Topics   • Alcohol use: Yes     Comment: ocassionally   • Drug use: No       Review of Systems   Constitutional: Positive for fatigue  Negative for appetite change, chills and fever  HENT: Positive for congestion and sinus pressure  Negative for ear pain, hearing loss, postnasal drip, rhinorrhea, sore throat, trouble swallowing and voice change  Eyes: Positive for blurred vision  Negative for photophobia, pain and visual disturbance  Respiratory: Negative for cough, chest tightness and shortness of breath  Cardiovascular: Negative for chest pain, palpitations, leg swelling, syncope and near-syncope  Gastrointestinal: Positive for nausea  Negative for abdominal pain, blood in stool, constipation, diarrhea and vomiting  Genitourinary: Positive for decreased urine volume   Negative for difficulty urinating, dysuria, flank pain and hematuria  Musculoskeletal: Negative for back pain, myalgias, neck pain and neck stiffness  Skin: Negative for rash  Neurological: Positive for headaches  Negative for dizziness, focal weakness, seizures, syncope, speech difficulty, weakness, light-headedness, numbness, paresthesias and loss of balance  Psychiatric/Behavioral: Negative for confusion and suicidal ideas  Physical Exam  Physical Exam  Vitals and nursing note reviewed  Constitutional:       General: She is not in acute distress  Appearance: Normal appearance  She is well-developed  She is not ill-appearing, toxic-appearing or diaphoretic  HENT:      Head: Normocephalic and atraumatic  Right Ear: External ear normal       Left Ear: External ear normal       Nose: Nose normal       Mouth/Throat:      Mouth: Mucous membranes are dry  Eyes:      General: Lids are normal  Vision grossly intact  Gaze aligned appropriately  No scleral icterus  Right eye: No discharge  Left eye: No discharge  Extraocular Movements: Extraocular movements intact  Right eye: No nystagmus  Left eye: No nystagmus  Conjunctiva/sclera: Conjunctivae normal       Pupils: Pupils are equal, round, and reactive to light  Neck:      Trachea: No tracheal deviation  Cardiovascular:      Rate and Rhythm: Normal rate and regular rhythm  Pulses: Normal pulses  Heart sounds: Normal heart sounds  No murmur heard  No friction rub  No gallop  Pulmonary:      Effort: Pulmonary effort is normal  No respiratory distress  Breath sounds: Normal breath sounds  No stridor  No wheezing, rhonchi or rales  Chest:      Chest wall: No tenderness  Abdominal:      General: Bowel sounds are normal       Palpations: Abdomen is soft  Tenderness: There is no abdominal tenderness  There is no right CVA tenderness, left CVA tenderness, guarding or rebound     Musculoskeletal: General: No swelling or deformity  Normal range of motion  Cervical back: Normal range of motion and neck supple  No rigidity or tenderness  Right lower leg: No edema  Left lower leg: No edema  Lymphadenopathy:      Cervical: No cervical adenopathy  Skin:     General: Skin is warm and dry  Findings: No bruising, erythema or rash  Neurological:      General: No focal deficit present  Mental Status: She is alert and oriented to person, place, and time  Cranial Nerves: No cranial nerve deficit  Sensory: No sensory deficit  Motor: No weakness  Coordination: Coordination normal       Gait: Abnormal gait: unsteady gait  Psychiatric:         Behavior: Behavior normal       Comments: Flat affect  Hesitant speech  Chronic per          Vital Signs  ED Triage Vitals [12/13/22 0248]   Temperature Pulse Respirations Blood Pressure SpO2   97 5 °F (36 4 °C) 83 18 165/81 99 %      Temp Source Heart Rate Source Patient Position - Orthostatic VS BP Location FiO2 (%)   Temporal -- Lying Right arm --      Pain Score       8           Vitals:    12/13/22 0430 12/13/22 0500 12/13/22 0503 12/13/22 0530   BP: 167/79 159/76 159/76 156/72   Pulse: 68 66 68 67   Patient Position - Orthostatic VS:             Visual Acuity  Visual Acuity    Flowsheet Row Most Recent Value   Visual acuity R eye is 20/50   Visual acuity Left eye is 20/70   Visual acuity in both eyes is 20/40   Wearing corrective eyewear/lenses?  No   No corrective eyewear/lenses Yes   L Pupil Size (mm) 2   R Pupil Size (mm) 2          ED Medications  Medications   ondansetron (ZOFRAN) injection 4 mg (4 mg Intravenous Not Given 12/13/22 0401)   potassium chloride (K-DUR,KLOR-CON) CR tablet 20 mEq (has no administration in time range)   sodium chloride 0 9 % bolus 1,000 mL (0 mL Intravenous Stopped 12/13/22 0430)   ketorolac (TORADOL) injection 15 mg (15 mg Intravenous Given 12/13/22 0441)       Diagnostic Studies  Results Reviewed     Procedure Component Value Units Date/Time    HS Troponin I 2hr [[id number]]  (Normal) Collected: 12/13/22 0517    Lab Status: Final result Specimen: Blood from Arm, Right Updated: 12/13/22 0544     hs TnI 2hr 6 ng/L      Delta 2hr hsTnI 0 ng/L     Protime-INR [[id number]]  (Normal) Collected: 12/13/22 0408    Lab Status: Final result Specimen: Blood from Arm, Right Updated: 12/13/22 0426     Protime 12 9 seconds      INR 0 91    APTT [[id number]]  (Normal) Collected: 12/13/22 0408    Lab Status: Final result Specimen: Blood from Arm, Right Updated: 12/13/22 0426     PTT 23 seconds     FLU/RSV/COVID - if FLU/RSV clinically relevant [[id number]]  (Normal) Collected: 12/13/22 0332    Lab Status: Final result Specimen: Nares from Nose Updated: 12/13/22 0418     SARS-CoV-2 Negative     INFLUENZA A PCR Negative     INFLUENZA B PCR Negative     RSV PCR Negative    Narrative:      FOR PEDIATRIC PATIENTS - copy/paste COVID Guidelines URL to browser: [email or web address]    SARS-CoV-2 assay is a Nucleic Acid Amplification assay intended for the  qualitative detection of nucleic acid from SARS-CoV-2 in nasopharyngeal  swabs  Results are for the presumptive identification of SARS-CoV-2 RNA  Positive results are indicative of infection with SARS-CoV-2, the virus  causing COVID-19, but do not rule out bacterial infection or co-infection  with other viruses  Laboratories within the [place] and its  territories are required to report all positive results to the appropriate  public health authorities  Negative results do not preclude SARS-CoV-2  infection and should not be used as the sole basis for treatment or other  patient management decisions  Negative results must be combined with  clinical observations, patient history, and epidemiological information  This test has not been FDA cleared or approved      This test has been authorized by FDA under an Emergency Use Authorization  (EUA)  This test is only authorized for the duration of time the  declaration that circumstances exist justifying the authorization of the  emergency use of an in vitro diagnostic tests for detection of SARS-CoV-2  virus and/or diagnosis of COVID-19 infection under section 564(b)(1) of  the Act, 21 U  S C  388MBC-8(C)(4), unless the authorization is terminated  or revoked sooner  The test has been validated but independent review by FDA  and CLIA is pending  Test performed using Rawporter GeneXpert: This RT-PCR assay targets N2,  a region unique to SARS-CoV-2  A conserved region in the E-gene was chosen  for pan-Sarbecovirus detection which includes SARS-CoV-2  According to CMS-2020-01-R, this platform meets the definition of high-throughput technology      Urine Microscopic [[id number]]  (Normal) Collected: 12/13/22 0332    Lab Status: Final result Specimen: Urine, Clean Catch Updated: 12/13/22 0409     RBC, UA None Seen /hpf      WBC, UA 1-2 /hpf      Epithelial Cells Occasional /hpf      Bacteria, UA Occasional /hpf      MUCUS THREADS None Seen    HS Troponin I 4hr [[id number]]     Lab Status: No result Specimen: Blood     HS Troponin 0hr (reflex protocol) [[id number]]  (Normal) Collected: 12/13/22 0332    Lab Status: Final result Specimen: Blood from Arm, Right Updated: 12/13/22 0403     hs TnI 0hr 6 ng/L     Comprehensive metabolic panel [[id number]]  (Abnormal) Collected: 12/13/22 0332    Lab Status: Final result Specimen: Blood from Arm, Right Updated: 12/13/22 0355     Sodium 140 mmol/L      Potassium 3 4 mmol/L      Chloride 100 mmol/L      CO2 27 mmol/L      ANION GAP 13 mmol/L      BUN 14 mg/dL      Creatinine 0 78 mg/dL      Glucose 128 mg/dL      Calcium 9 9 mg/dL      AST 26 U/L      ALT 41 U/L      Alkaline Phosphatase 92 U/L      Total Protein 8 3 g/dL      Albumin 4 6 g/dL      Total Bilirubin 0 50 mg/dL      eGFR 84 ml/min/1 73sq m     Narrative: National Kidney Disease Foundation guidelines for Chronic Kidney Disease (CKD):   •  Stage 1 with normal or high GFR (GFR > 90 mL/min/1 73 square meters)  •  Stage 2 Mild CKD (GFR = 60-89 mL/min/1 73 square meters)  •  Stage 3A Moderate CKD (GFR = 45-59 mL/min/1 73 square meters)  •  Stage 3B Moderate CKD (GFR = 30-44 mL/min/1 73 square meters)  •  Stage 4 Severe CKD (GFR = 15-29 mL/min/1 73 square meters)  •  Stage 5 End Stage CKD (GFR <15 mL/min/1 73 square meters)  Note: GFR calculation is accurate only with a steady state creatinine    Magnesium [[id number]]  (Normal) Collected: 12/13/22 0332    Lab Status: Final result Specimen: Blood from Arm, Right Updated: 12/13/22 0355     Magnesium 2 1 mg/dL     UA w Reflex to Microscopic w Reflex to Culture [[id number]]  (Abnormal) Collected: 12/13/22 0332    Lab Status: Final result Specimen: Urine, Clean Catch Updated: 12/13/22 0347     Color, UA Yellow     Clarity, UA Clear     Specific Gravity, UA 1 015     pH, UA 6 5     Leukocytes, UA Trace     Nitrite, UA Negative     Protein, UA Negative mg/dl      Glucose, UA Negative mg/dl      Ketones, UA Negative mg/dl      Urobilinogen, UA <2 0 mg/dl      Bilirubin, UA Negative     Occult Blood, UA Negative    CBC and differential [[id number]] Collected: 12/13/22 0332    Lab Status: Final result Specimen: Blood from Arm, Right Updated: 12/13/22 0341     WBC 8 82 Thousand/uL      RBC 5 01 Million/uL      Hemoglobin 15 3 g/dL      Hematocrit 45 1 %      MCV 90 fL      MCH 30 5 pg      MCHC 33 9 g/dL      RDW 12 7 %      MPV 11 8 fL      Platelets 317 Thousands/uL      nRBC 0 /100 WBCs      Neutrophils Relative 63 %      Immat GRANS % 0 %      Lymphocytes Relative 27 %      Monocytes Relative 7 %      Eosinophils Relative 2 %      Basophils Relative 1 %      Neutrophils Absolute 5 59 Thousands/µL      Immature Grans Absolute 0 03 Thousand/uL      Lymphocytes Absolute 2 39 Thousands/µL      Monocytes Absolute 0 64 Thousand/µL Eosinophils Absolute 0 13 Thousand/µL      Basophils Absolute 0 04 Thousands/µL                  CT head without contrast   Final Result by Roxane New DO (12/13 0330)      No acute intracranial hemorrhage  Multifocal areas of periventricular and subcortical white matter hypoattenuation  In the absence of comparison studies, stability cannot be assessed  Findings could be secondary to microangiopathic change  If there is concern for acute infarction, MRI    should be performed  The study was marked in [facility] for immediate notification  Workstation performed: [id number]                    Procedures  ECG 12 Lead Documentation Only    Date/Time: 12/13/2022 3:12 AM  Performed by: Lion Rivera MD  Authorized by: Lion Rivera MD     Indications / Diagnosis:  Minnie Hubbardrobertomarybeth  ECG reviewed by me, the ED Provider: yes    Patient location:  ED  Previous ECG:     Previous ECG:  Compared to current    Similarity:  No change    Comparison to cardiac monitor: Yes    Interpretation:     Interpretation: non-specific    Rate:     ECG rate:  66    ECG rate assessment: normal    Rhythm:     Rhythm: sinus rhythm    Ectopy:     Ectopy: none    QRS:     QRS axis:  Normal    QRS intervals:  Normal  Conduction:     Conduction: normal    ST segments:     ST segments:  Normal  T waves:     T waves: non-specific               ED Course  ED Course as of 12/13/22 0550   Tue Dec 13, 2022   8622 CT noted  No focal findings  Sx ongoing for 3 days  Has had white matter disease on prior imaging per neuro notes  Do not suspect pt requires emergent MRI     0549 Pt feeling improved  Workup benign  Suspect sx may be due to URI  Viral panel neg  Discussed supportive care, PCP f/u, and RTED precautions  No focal findings on exam  Will po trial and ambulate prior to d/c  Pt and  agree with plan                                  SBIRT 20yo+    Flowsheet Row Most Recent Value   SBIRT (25 yo +)    In order to provide better care to our patients, we are screening all of our patients for alcohol and drug use  Would it be okay to ask you these screening questions? Yes Filed at: 12/13/2022 0402   Initial Alcohol Screen: US AUDIT-C     1  How often do you have a drink containing alcohol? 0 Filed at: 12/13/2022 0402   2  How many drinks containing alcohol do you have on a typical day you are drinking? 0 Filed at: 12/13/2022 0402   3a  Male UNDER 65: How often do you have five or more drinks on one occasion? 0 Filed at: 12/13/2022 0402   3b  FEMALE Any Age, or MALE 65+: How often do you have 4 or more drinks on one occassion? 0 Filed at: 12/13/2022 0402   Audit-C Score 0 Filed at: 12/13/2022 9618   STEVEN: How many times in the past year have you    Used an illegal drug or used a prescription medication for non-medical reasons?  Never Filed at: 12/13/2022 0402                    MDM  Number of Diagnoses or Management Options  Headache: new and requires workup  Hypokalemia: new and requires workup  URI (upper respiratory infection): new and requires workup     Amount and/or Complexity of Data Reviewed  Clinical lab tests: reviewed and ordered  Tests in the radiology section of CPT®: reviewed and ordered  Tests in the medicine section of CPT®: ordered and reviewed  Review and summarize past medical records: yes  Independent visualization of images, tracings, or specimens: yes    Risk of Complications, Morbidity, and/or Mortality  Presenting problems: moderate  Diagnostic procedures: low  Management options: low    Patient Progress  Patient progress: improved      Disposition  Final diagnoses:   Headache   URI (upper respiratory infection)   Hypokalemia     Time reflects when diagnosis was documented in both MDM as applicable and the Disposition within this note     Time User Action Codes Description Comment    12/13/2022  5:46 AM Gerardo PACK Add [R51 9] Headache     12/13/2022  5:46 AM Alicia Aquino Add [J32 9] Sinusitis 12/13/2022  5:46 AM Efrem PACK Add [J06 9] URI (upper respiratory infection)     12/13/2022  5:46 AM Frank Jacobs Remove [J32 9] Sinusitis     12/13/2022  5:48 AM Frank Jacobs Add [E87 6] Hypokalemia       ED Disposition     ED Disposition   Discharge    Condition   Stable    Date/Time   Tue Dec 13, 2022  5:46 AM    Pennie Wolff discharge to home/self care  Follow-up Information     Follow up With Specialties Details Why Contact Info Additional [address] Roa DO Internal Medicine Schedule an appointment as soon as possible for a visit   [place] [address] Emergency Department Emergency Medicine  If symptoms worsen [address] [place], [id number]  [address] [facility], [address] [place] [address] , Carol Madden Sanchez [address]          Patient's Medications   Discharge Prescriptions    ONDANSETRON (ZOFRAN-ODT) 4 MG DISINTEGRATING TABLET    Take 1 tablet (4 mg total) by mouth every 6 (six) hours as needed for nausea or vomiting       Start Date: 12/13/2022End Date: --       Order Dose: 4 mg       Quantity: 20 tablet    Refills: 0       No discharge procedures on file      PDMP Review     None          ED Provider  Electronically Signed by           Carlos Tian MD  12/13/22 0703

## 2022-12-16 LAB
ATRIAL RATE: 66 BPM
P AXIS: 57 DEGREES
PR INTERVAL: 150 MS
QRS AXIS: 53 DEGREES
QRSD INTERVAL: 84 MS
QT INTERVAL: 444 MS
QTC INTERVAL: 465 MS
T WAVE AXIS: 65 DEGREES
VENTRICULAR RATE: 66 BPM

## 2023-01-16 ENCOUNTER — TELEPHONE (OUTPATIENT)
Dept: OTHER | Facility: OTHER | Age: 58
End: 2023-01-16

## 2023-01-16 NOTE — TELEPHONE ENCOUNTER
Reviewed [facility] records and no note was made of any abdominal exams on her admission  Called pt back, she notes her [facility] bill notes an US done and ordered by DAGOBERTO Myers MD  In the system there is Extended Fast (EFAST) exam from 12/16/22 ordered by him, noted as diagnostic  ([facility] records obtained and scanned to [facility] for your review including the EFAST exam)  Suggested to pt to proceed with Dr Arvin Velazco ordered tests US abdomen complete and US elastography as recommended

## 2023-01-16 NOTE — TELEPHONE ENCOUNTER
Patient called in post having a CT of Abdomen completed at [facility] on 12/16/22  Patient would like to know if this will replace the US abdomen complete and US elastography ordered  Please follow up with patient to advise

## 2023-01-17 NOTE — TELEPHONE ENCOUNTER
Please see EFAST test (not ct scan) and discharge summary which includes her LFTs from 12/17 in media now  Yesterday I did suggest to the pt to proceed with the previously ordered US tests unless Dr Marlene Adam notes otherwise

## 2023-01-19 LAB
ALBUMIN SERPL-MCNC: 4.9 G/DL (ref 3.8–4.9)
ALP SERPL-CCNC: 84 IU/L (ref 44–121)
ALT SERPL-CCNC: 26 IU/L (ref 0–32)
AST SERPL-CCNC: 17 IU/L (ref 0–40)
BILIRUB DIRECT SERPL-MCNC: <0.1 MG/DL (ref 0–0.4)
BILIRUB SERPL-MCNC: 0.3 MG/DL (ref 0–1.2)
CHOLEST SERPL-MCNC: 220 MG/DL (ref 100–199)
CHOLEST/HDLC SERPL: 5.1 RATIO (ref 0–4.4)
HDLC SERPL-MCNC: 43 MG/DL
LDLC SERPL CALC-MCNC: 153 MG/DL (ref 0–99)
PROT SERPL-MCNC: 6.8 G/DL (ref 6–8.5)
SL AMB VLDL CHOLESTEROL CALC: 24 MG/DL (ref 5–40)
TRIGL SERPL-MCNC: 134 MG/DL (ref 0–149)

## 2023-02-07 ENCOUNTER — TELEPHONE (OUTPATIENT)
Dept: OTHER | Facility: OTHER | Age: 58
End: 2023-02-07

## 2023-02-07 ENCOUNTER — TELEPHONE (OUTPATIENT)
Dept: GASTROENTEROLOGY | Facility: CLINIC | Age: 58
End: 2023-02-07

## 2023-02-07 DIAGNOSIS — K76.0 FATTY LIVER: Primary | ICD-10-CM

## 2023-02-07 NOTE — TELEPHONE ENCOUNTER
Called the patient and reviewed studies  Patient did have ultrasonography  Normal spleen and fatty liver noted  Elasticity shows mild stiffness range  In mid January patient's LFTs were completely within normal limits  Lipid profile elevated with cholesterol 220 and   Should follow-up with Dr Raquel Malone for this  Patient is losing weight which is a good sign  Advised patient that losing 10% of her body weight would reverse the fatty liver    -- Follow-up in the office in July or August   LFTs prior

## 2023-04-28 DIAGNOSIS — E03.9 HYPOTHYROIDISM, UNSPECIFIED TYPE: ICD-10-CM

## 2023-04-28 RX ORDER — LEVOTHYROXINE SODIUM 0.12 MG/1
TABLET ORAL
Qty: 32 TABLET | Refills: 5 | Status: SHIPPED | OUTPATIENT
Start: 2023-04-28

## 2023-06-08 ENCOUNTER — OFFICE VISIT (OUTPATIENT)
Dept: GASTROENTEROLOGY | Facility: CLINIC | Age: 58
End: 2023-06-08
Payer: COMMERCIAL

## 2023-06-08 VITALS
DIASTOLIC BLOOD PRESSURE: 92 MMHG | BODY MASS INDEX: 28.49 KG/M2 | HEIGHT: 65 IN | WEIGHT: 171 LBS | SYSTOLIC BLOOD PRESSURE: 148 MMHG

## 2023-06-08 DIAGNOSIS — K76.0 FATTY LIVER: Primary | ICD-10-CM

## 2023-06-08 DIAGNOSIS — Z12.11 COLON CANCER SCREENING: ICD-10-CM

## 2023-06-08 DIAGNOSIS — Z80.0 FAMILY HISTORY OF COLON CANCER IN FATHER: ICD-10-CM

## 2023-06-08 PROCEDURE — 99213 OFFICE O/P EST LOW 20 MIN: CPT | Performed by: INTERNAL MEDICINE

## 2023-06-08 RX ORDER — AMLODIPINE BESYLATE 5 MG/1
5 TABLET ORAL DAILY
COMMUNITY

## 2023-06-08 RX ORDER — LAMOTRIGINE 25 MG/1
25 TABLET ORAL DAILY
COMMUNITY

## 2023-06-08 RX ORDER — ALBUTEROL SULFATE 90 UG/1
2 AEROSOL, METERED RESPIRATORY (INHALATION) EVERY 6 HOURS PRN
COMMUNITY

## 2023-06-08 NOTE — PROGRESS NOTES
[id number] - Outpatient Follow-up Note  Vivek Zaman 61 y o  female MRN: [id number]  Encounter: [id number]    ASSESSMENT AND PLAN:      1  Fatty liver  --Recent determinations with patient's liver function studies are within normal range  Did have a elasticity study which showed mild stiffness  Probable fatty sparing  Repeat labs  Patient has lost about 20 pounds  Has been the last 6 months  This would represent 10% of her body weight which is ideal   This is the most important thing with respect to fatty liver    - Hepatic function panel; Future  - FIB-4 w/Reflex ALMENDAREZ FibroSure; Future    -Would discontinue vitamin E at this time  Recheck LFTs in about 4 months    2  Family history of colon cancer in father  --Patient's father at an advanced age    1  Colon cancer screening  --To date with colonoscopies  Last examination 2020 for evaluation of anemia  She had a couple of hyperplastic polyps  Recall per protocol  -Did have hyperplastic polyps so recall may be 10 years  Check chart      Followup Appointment: 1 year  ______________________________________________________________________    Chief Complaint   Patient presents with   • Follow-up     6 month visit     HPI: Returns to the office for routine follow-up  Followed the patient for abnormal liver function studies and fatty liver  Her last liver panel was done in January and was fluids in normal limits  Patient did have some hyperlipidemia and is being followed by her PCP Dr Anabell Nieto for this problem  Patient did have a elasticity study of the liver which showed some mild stiffness  On the last 2 determinations her liver function studies are normal   On a positive note patient has lost about 20 pounds in the past 6 months  This would represent over 10% of her body weight  She has been on vitamin E for the fatty liver and we reviewed some of the issues surrounding this recommendation    Overall patient is doing well without fatigue or abdominal pain  Patient previously with a history of anemia which resolved  Did have upper and lower endoscopy within the last 3 years  No millimeters colon polyps were noted on last examination    Historical Information   Past Medical History:   Diagnosis Date   • Anemia    • Anxiety    • Bipolar 1 disorder ([address] )    • Colon polyp    • GERD (gastroesophageal reflux disease)    • HTN (hypertension)    • Hyperlipidemia    • Hypertension    • Hypothyroidism      Past Surgical History:   Procedure Laterality Date   •  SECTION     •  SECTION     • HYSTERECTOMY     • THYROIDECTOMY     • THYROIDECTOMY     • TOTAL ABDOMINAL HYSTERECTOMY W/ BILATERAL SALPINGOOPHORECTOMY     • UPPER GASTROINTESTINAL ENDOSCOPY       Social History     Substance and Sexual Activity   Alcohol Use Yes    Comment: ocassionally     Social History     Substance and Sexual Activity   Drug Use No     Social History     Tobacco Use   Smoking Status Every Day   • Packs/day: 0 25   • Years: 20 00   • Total pack years: 5 00   • Types: Cigarettes   Smokeless Tobacco Never   Tobacco Comments    3-6 cigarettes per day        Family History   Problem Relation Age of Onset   • Hypertension Mother    • Ovarian cancer Mother    • Hypothyroidism Mother    • Hypertension Father    • Hyperlipidemia Father    • Heart disease Father    • Colon cancer Father         Rectal cancer   • Rectal cancer Father    • Hypertension Sister    • Hypertension Brother    • Hypertension Daughter    • Hypertension Sister    • Hypertension Brother    • No Known Problems Brother    • No Known Problems Brother    • Colon polyps Neg Hx          Current Outpatient Medications:   •  albuterol (ProAir HFA) 90 mcg/act inhaler  •  amLODIPine (Norvasc) 5 mg tablet  •  cholecalciferol (VITAMIN D3) 1,000 units tablet  •  gabapentin (NEURONTIN) 400 mg capsule  •  hydrochlorothiazide (HYDRODIURIL) 25 mg tablet  •  lamoTRIgine (LaMICtal) 25 mg "tablet  •  levothyroxine (Synthroid) 125 mcg tablet  •  losartan (COZAAR) 100 MG tablet  •  Multiple Vitamin (MULTIVITAMIN) capsule  •  Omega-3 Fatty Acids (FISH OIL) 1,000 mg  •  pantoprazole (PROTONIX) 40 mg tablet  •  psyllium (METAMUCIL) 58 6 % packet  •  Thiamine HCl (VITAMIN B1 PO)  •  vitamin E 100 UNIT capsule  •  amitriptyline (ELAVIL) 10 mg tablet  •  ARIPiprazole (ABILIFY) 10 mg tablet  •  cyanocobalamin (VITAMIN B-12) 250 MCG tablet  •  fluorouracil (EFUDEX) 5 % cream  •  gabapentin (NEURONTIN) 300 mg capsule  •  Loratadine 10 MG CAPS  •  mometasone (NASONEX) 50 mcg/act nasal spray  •  ondansetron (ZOFRAN-ODT) 4 mg disintegrating tablet  Allergies   Allergen Reactions   • Lisinopril Cough     Reviewed medications and allergies and updated as indicated    PHYSICAL EXAM:    Blood pressure 148/92, height 5' 5\" (1 651 m), weight 77 6 kg (171 lb)  Body mass index is 28 46 kg/m²  General Appearance: NAD, cooperative, alert  Eyes: Anicteric, conjunctiva pink  ENT:  Normocephalic, atraumatic, normal mucosa  Neck:  Supple, symmetrical, trachea midline  Resp:  Clear to auscultation bilaterally; no rales, rhonchi or wheezing; respirations unlabored   CV:  S1 S2, Regular rate and rhythm; no murmur, rub, or gallop  GI:  Soft, non-tender, non-distended; normal bowel sounds; no masses, no organomegaly   Rectal: Deferred  Musculoskeletal: No cyanosis, clubbing or edema  Normal ROM    Skin:  No jaundice, rashes, or lesions   Heme/Lymph: No palpable cervical lymphadenopathy  Psych: Normal affect, good eye contact  Neuro: No gross deficits, AAOx3    Lab Results:   Lab Results   Component Value Date    HCT 45 1 12/13/2022    HGB 15 3 12/13/2022    MCV 90 12/13/2022     12/13/2022    WBC 8 82 12/13/2022     Lab Results   Component Value Date    ALKPHOS 92 12/13/2022    ALT 26 01/18/2023    AST 17 01/18/2023    BUN 14 12/13/2022    CALCIUM 9 9 12/13/2022     12/13/2022    CO2 27 12/13/2022    CREATININE 0 78 " 12/13/2022    EGFR 84 12/13/2022    GLUCOSE 104 01/20/2018    K 3 4 (L) 12/13/2022

## 2023-06-08 NOTE — PATIENT INSTRUCTIONS
[id number] - Outpatient Follow-up Note  Darius Ha 61 y o  female MRN: [id number]  Encounter: [id number]    ASSESSMENT AND PLAN:      1  Fatty liver  --Recent determinations with patient's liver function studies are within normal range  Did have a elasticity study which showed mild stiffness  Probable fatty sparing  Repeat labs  Patient has lost about 20 pounds  Has been the last 6 months  This would represent 10% of her body weight which is ideal   This is the most important thing with respect to fatty liver    - Hepatic function panel; Future  - FIB-4 w/Reflex ALMENDAREZ FibroSure; Future    -Would discontinue vitamin E at this time  Recheck LFTs in about 4 months    2  Family history of colon cancer in father  --Patient's father at an advanced age    1  Colon cancer screening  --To date with colonoscopies  Last examination 2020 for evaluation of anemia  She had a couple of hyperplastic polyps  Recall per protocol  -Did have hyperplastic polyps so recall may be 10 years    Check chart

## 2023-09-01 LAB
25(OH)D3+25(OH)D2 SERPL-MCNC: 38.7 NG/ML (ref 30–100)
ALBUMIN SERPL-MCNC: 4.4 G/DL (ref 3.8–4.9)
ALBUMIN/GLOB SERPL: 2.1 {RATIO} (ref 1.2–2.2)
ALP SERPL-CCNC: 81 IU/L (ref 44–121)
ALT SERPL-CCNC: 39 IU/L (ref 0–32)
AST SERPL-CCNC: 25 IU/L (ref 0–40)
BILIRUB SERPL-MCNC: 0.3 MG/DL (ref 0–1.2)
BUN SERPL-MCNC: 13 MG/DL (ref 6–24)
BUN/CREAT SERPL: 22 (ref 9–23)
CALCIUM SERPL-MCNC: 9.3 MG/DL (ref 8.7–10.2)
CHLORIDE SERPL-SCNC: 104 MMOL/L (ref 96–106)
CHOLEST SERPL-MCNC: 210 MG/DL (ref 100–199)
CHOLEST/HDLC SERPL: 5.4 RATIO (ref 0–4.4)
CO2 SERPL-SCNC: 21 MMOL/L (ref 20–29)
CREAT SERPL-MCNC: 0.58 MG/DL (ref 0.57–1)
EGFR: 105 ML/MIN/1.73
EST. AVERAGE GLUCOSE BLD GHB EST-MCNC: 123 MG/DL
GLOBULIN SER-MCNC: 2.1 G/DL (ref 1.5–4.5)
GLUCOSE SERPL-MCNC: 102 MG/DL (ref 70–99)
HBA1C MFR BLD: 5.9 % (ref 4.8–5.6)
HDLC SERPL-MCNC: 39 MG/DL
LDLC SERPL CALC-MCNC: 135 MG/DL (ref 0–99)
POTASSIUM SERPL-SCNC: 3.9 MMOL/L (ref 3.5–5.2)
PROT SERPL-MCNC: 6.5 G/DL (ref 6–8.5)
SL AMB VLDL CHOLESTEROL CALC: 36 MG/DL (ref 5–40)
SODIUM SERPL-SCNC: 142 MMOL/L (ref 134–144)
T4 FREE SERPL-MCNC: 1.6 NG/DL (ref 0.82–1.77)
TRIGL SERPL-MCNC: 199 MG/DL (ref 0–149)
TSH SERPL DL<=0.005 MIU/L-ACNC: 0.56 UIU/ML (ref 0.45–4.5)

## 2023-09-05 ENCOUNTER — OFFICE VISIT (OUTPATIENT)
Dept: ENDOCRINOLOGY | Facility: HOSPITAL | Age: 58
End: 2023-09-05
Payer: COMMERCIAL

## 2023-09-05 VITALS
DIASTOLIC BLOOD PRESSURE: 70 MMHG | HEIGHT: 65 IN | OXYGEN SATURATION: 98 % | WEIGHT: 184 LBS | BODY MASS INDEX: 30.66 KG/M2 | SYSTOLIC BLOOD PRESSURE: 128 MMHG | HEART RATE: 88 BPM

## 2023-09-05 DIAGNOSIS — E78.5 HYPERLIPIDEMIA, UNSPECIFIED HYPERLIPIDEMIA TYPE: ICD-10-CM

## 2023-09-05 DIAGNOSIS — R73.03 PREDIABETES: ICD-10-CM

## 2023-09-05 DIAGNOSIS — E55.9 VITAMIN D DEFICIENCY: ICD-10-CM

## 2023-09-05 DIAGNOSIS — E89.0 POSTOPERATIVE HYPOTHYROIDISM: Primary | ICD-10-CM

## 2023-09-05 PROCEDURE — 99214 OFFICE O/P EST MOD 30 MIN: CPT | Performed by: NURSE PRACTITIONER

## 2023-09-05 NOTE — PROGRESS NOTES
Tina Moreno 61 y.o. female MRN: [id number]    Encounter: [id number]      Assessment/Plan     Assessment: This is a 61y.o.-year-old female with postoperative hypothyroidism and prediabetes. Plan:  1. Hypothyroidism postoperative: Recent TSH is slightly low normal with a high normal free T4. For now, she will continue levothyroxine 125 mcg daily and omit her half tablet on Sunday. Recheck TSH and free T4 in 6 weeks to reassess and then prior to next office visit. We will contact her with the results. 2. Prediabetes:  Most recent hemoglobin A1c is 5.9.  Encouraged diet, exercise and lifestyle modification.  Check CMP, A1c, lipid panel before next appointment which will be in 1 year.    3. Vitamin-D deficiency:  Continue supplementation. 4. Hyperlipidemia:  Discussed the impact of a proper diet and exercise and the need for medication. She will discuss with her PCP. 5. Hypertension: Normotensive in the office today. Continue Losartan  And take blood pressures at home for surveillance. CC: Hypothyroidism/Pre-diabetes follow-up    History of Present Illness     HPI:  61 y. o. year old female with history of hypothyroidism after surgery, prediabetes who presents for a follow-up appointment.  In 2010, she underwent surgery for nodules and fortunately pathology came back benign.  She is treated with Synthroid brand 125 mcg daily with 1.5 tablet on Sunday.  Her most recent TSH from August 31, 2023 is 0.556 with free T4 of 1.60.  No neck compressive symptoms. Liss Li presents today overall feeling well.      For her prediabetes, her most recent hemoglobin A1c is 5.9 from August 31, 2023 with a fasting blood sugar of 102.     For her vitamin-D deficiency, she supplements with 2000 units of vitamin D3 daily. Most recent 25 hydroxy vitamin-D level is 38.7. Review of Systems   Constitutional: Negative. Negative for chills, fatigue and fever. HENT: Negative.   Negative for trouble swallowing and voice change. Eyes: Negative. Negative for photophobia, pain, discharge, redness, itching and visual disturbance. Respiratory: Negative. Negative for chest tightness and shortness of breath. Cardiovascular: Negative. Negative for chest pain. Gastrointestinal: Negative. Negative for abdominal pain, constipation, diarrhea and vomiting. Endocrine: Negative for cold intolerance, heat intolerance, polydipsia, polyphagia and polyuria. Genitourinary: Negative. Musculoskeletal: Negative. Skin: Negative. Allergic/Immunologic: Negative. Neurological: Negative. Negative for dizziness, syncope, light-headedness and headaches. Hematological: Negative. Psychiatric/Behavioral: Negative. All other systems reviewed and are negative. Historical Information   Past Medical History:   Diagnosis Date   • Anemia    • Anxiety    • Bipolar 1 disorder ([address])    • Colon polyp    • GERD (gastroesophageal reflux disease)    • HTN (hypertension)    • Hyperlipidemia    • Hypertension    • Hypothyroidism      Past Surgical History:   Procedure Laterality Date   •  SECTION     •  SECTION     • HYSTERECTOMY     • THYROIDECTOMY     • THYROIDECTOMY     • TOTAL ABDOMINAL HYSTERECTOMY W/ BILATERAL SALPINGOOPHORECTOMY     • UPPER GASTROINTESTINAL ENDOSCOPY       Social History   Social History     Substance and Sexual Activity   Alcohol Use Yes    Comment: ocassionally     Social History     Substance and Sexual Activity   Drug Use No     Social History     Tobacco Use   Smoking Status Every Day   • Packs/day: 0.25   • Years: 20.00   • Total pack years: 5.00   • Types: Cigarettes   Smokeless Tobacco Never   Tobacco Comments    3-6 cigarettes per day.       Family History:   Family History   Problem Relation Age of Onset   • Hypertension Mother    • Ovarian cancer Mother    • Hypothyroidism Mother    • Hypertension Father    • Hyperlipidemia Father    • Heart disease Father    • Colon cancer Father Rectal cancer   • Rectal cancer Father    • Hypertension Sister    • Hypertension Brother    • Hypertension Daughter    • Hypertension Sister    • Hypertension Brother    • No Known Problems Brother    • No Known Problems Brother    • Colon polyps Neg Hx        Meds/Allergies   Current Outpatient Medications   Medication Sig Dispense Refill   • albuterol (ProAir HFA) 90 mcg/act inhaler Inhale 2 puffs every 6 (six) hours as needed for wheezing     • amitriptyline (ELAVIL) 10 mg tablet Take 5 mg by mouth daily at bedtime  (Patient not taking: Reported on 7/12/2022)     • amLODIPine (Norvasc) 5 mg tablet Take 5 mg by mouth daily     • ARIPiprazole (ABILIFY) 10 mg tablet Take 10 mg by mouth daily (Patient not taking: Reported on 6/8/2023)     • cholecalciferol (VITAMIN D3) 1,000 units tablet Take 2,000 Units by mouth daily     • cyanocobalamin (VITAMIN B-12) 250 MCG tablet Take 500 mcg by mouth daily (Patient not taking: Reported on 6/8/2023)     • fluorouracil (EFUDEX) 5 % cream APPLY CREAM TOPICALLY TO AFFECTED AREA TWICE DAILY FOR 2 4 WEEKS AS TOLERATED (Patient not taking: No sig reported)  0   • gabapentin (NEURONTIN) 300 mg capsule Take 300 mg by mouth daily at bedtime (Patient not taking: Reported on 8/30/2022)     • gabapentin (NEURONTIN) 400 mg capsule Take 400 mg by mouth daily at bedtime     • hydrochlorothiazide (HYDRODIURIL) 25 mg tablet Take 25 mg by mouth daily     • lamoTRIgine (LaMICtal) 25 mg tablet Take 25 mg by mouth daily     • levothyroxine (Synthroid) 125 mcg tablet TAKE 1 TABLET 6 DAYS OF THE WEEK, TAKE ONE AND ONE HALF TABLETS ON SUNDAYS 32 tablet 5   • Loratadine 10 MG CAPS Take 1 capsule by mouth as needed  (Patient not taking: Reported on 6/8/2023)     • losartan (COZAAR) 100 MG tablet Take 100 mg by mouth daily     • mometasone (NASONEX) 50 mcg/act nasal spray 2 sprays into each nostril daily (Patient not taking: Reported on 6/8/2023)     • Multiple Vitamin (MULTIVITAMIN) capsule Take 1 capsule by mouth daily     • Omega-3 Fatty Acids (FISH OIL) 1,000 mg Take 1,000 mg by mouth daily     • ondansetron (ZOFRAN-ODT) 4 mg disintegrating tablet Take 1 tablet (4 mg total) by mouth every 6 (six) hours as needed for nausea or vomiting (Patient not taking: Reported on 6/8/2023) 20 tablet 0   • pantoprazole (PROTONIX) 40 mg tablet TAKE 1 TABLET BY MOUTH EVERY DAY 30 tablet 3   • psyllium (METAMUCIL) 58.6 % packet Take 1 packet by mouth daily     • Thiamine HCl (VITAMIN B1 PO) Take 1,000 mg by mouth daily     • vitamin E 100 UNIT capsule Take 100 Units by mouth daily       No current facility-administered medications for this visit. Allergies   Allergen Reactions   • Lisinopril Cough       Objective   Vitals: There were no vitals taken for this visit. Physical Exam  Vitals reviewed. Constitutional:       Appearance: She is well-developed. HENT:      Head: Normocephalic and atraumatic. Eyes:      Conjunctiva/sclera: Conjunctivae normal.      Pupils: Pupils are equal, round, and reactive to light. Cardiovascular:      Rate and Rhythm: Normal rate and regular rhythm. Heart sounds: Normal heart sounds. Pulmonary:      Effort: Pulmonary effort is normal.      Breath sounds: Normal breath sounds. Abdominal:      General: Bowel sounds are normal.      Palpations: Abdomen is soft. Musculoskeletal:         General: Normal range of motion. Cervical back: Normal range of motion and neck supple. Skin:     General: Skin is warm and dry. Neurological:      Mental Status: She is alert and oriented to person, place, and time. Psychiatric:         Behavior: Behavior normal.         Thought Content: Thought content normal.         Judgment: Judgment normal.       Lab Results:   Lab Results   Component Value Date/Time    Free t4 1.60 08/31/2023 09:49 AM     Portions of the record may have been created with voice recognition software.  Occasional wrong word or "sound a like" substitutions may have occurred due to the inherent limitations of voice recognition software. Read the chart carefully and recognize, using context, where substitutions have occurred.

## 2023-09-05 NOTE — PATIENT INSTRUCTIONS
Be mindful of diet. Stay active and stay hydrated. For now, continue Synthroid 125 mcg all 7 days of the week. Recheck lab work in 8 weeks. Complete lab work as prescribed. Contact the office with any change in symptoms. Discuss cholesterol finding with PCP, given ongoing testing through GI. Your blood pressure was elevated in the office today. Continue Losartan and check your blood pressures at home.

## 2023-11-04 DIAGNOSIS — E03.9 HYPOTHYROIDISM, UNSPECIFIED TYPE: ICD-10-CM

## 2023-11-06 RX ORDER — LEVOTHYROXINE SODIUM 0.12 MG/1
TABLET ORAL
Qty: 32 TABLET | Refills: 5 | Status: SHIPPED | OUTPATIENT
Start: 2023-11-06

## 2024-01-19 ENCOUNTER — TELEPHONE (OUTPATIENT)
Dept: GASTROENTEROLOGY | Facility: CLINIC | Age: 59
End: 2024-01-19

## 2024-01-19 DIAGNOSIS — K76.0 FATTY LIVER: Primary | ICD-10-CM

## 2024-01-19 LAB
ALBUMIN SERPL-MCNC: 4.6 G/DL (ref 3.8–4.9)
ALP SERPL-CCNC: 95 IU/L (ref 44–121)
ALT SERPL-CCNC: 56 IU/L (ref 0–32)
AST SERPL-CCNC: 31 IU/L (ref 0–40)
BILIRUB DIRECT SERPL-MCNC: <0.1 MG/DL (ref 0–0.4)
BILIRUB SERPL-MCNC: 0.3 MG/DL (ref 0–1.2)
FIB 4 INDEX: 0.9 (ref 0–2.67)
PLATELET # BLD AUTO: 271 X10E3/UL (ref 150–450)
PROT SERPL-MCNC: 6.6 G/DL (ref 6–8.5)
T4 FREE SERPL-MCNC: 1.55 NG/DL (ref 0.82–1.77)
TSH SERPL DL<=0.005 MIU/L-ACNC: 2.86 UIU/ML (ref 0.45–4.5)

## 2024-01-19 NOTE — TELEPHONE ENCOUNTER
I called the patient and left a voice message.  ALT slightly elevated but fib 4 study favorable low risk for scarring or fibrosis in the liver.  Repeat LFTs in about 4 to 5 months.

## 2024-03-01 ENCOUNTER — NURSE TRIAGE (OUTPATIENT)
Age: 59
End: 2024-03-01

## 2024-03-01 NOTE — TELEPHONE ENCOUNTER
"Please advise     Marin Parkinson psychiatric NP calling in, pt has been on multiple medications for bipolar and currently on low dose Lamictal 50 mg, JAVIER Parkinson would like to know if this is okay from a GI prospective for pt to continue. His call back number is [phone]    He is requesting lab results faxed to his office at Fax # [phone]       Reason for Disposition   Caller has NON-URGENT medicine question about med that PCP or specialist prescribed and triager unable to answer question    Answer Assessment - Initial Assessment Questions  1. NAME of MEDICATION: \"What medicine are you calling about?\"      Lamictal   2. QUESTION: \"What is your question?\" (e.g., medication refill, side effect)      Okay to continue from GI prospective?   3. PRESCRIBING HCP: \"Who prescribed it?\" Reason: if prescribed by specialist, call should be referred to that group.      Psychiatric NP    Protocols used: Medication Question Call-ADULT-[place]    "

## 2024-03-04 ENCOUNTER — TELEPHONE (OUTPATIENT)
Age: 59
End: 2024-03-04

## 2024-03-21 NOTE — PROGRESS NOTES
7/24/2018    Assessment/Plan      Diagnoses and all orders for this visit:    Postoperative hypothyroidism  -     TSH, 3rd generation Lab Collect; Future  -     T4, free Lab Collect; Future  -     TSH, 3rd generation Lab Collect  -     T4, free Lab Collect  -     TSH, 3rd generation Lab Collect; Future  -     T4, free Lab Collect; Future  -     TSH, 3rd generation Lab Collect  -     T4, free Lab Collect    Hypothyroidism, unspecified type  -     SYNTHROID 125 MCG tablet; TAKE 1 TABLET DAILY 6 days of the week and 1 5 tabs the 7th day  BRAND NECESSARY  History of Hashimoto thyroiditis    Prediabetes  -     Comprehensive metabolic panel Lab Collect; Future  -     HEMOGLOBIN A1C W/ EAG ESTIMATION Lab Collect; Future  -     Comprehensive metabolic panel Lab Collect  -     HEMOGLOBIN A1C W/ EAG ESTIMATION Lab Collect    Other orders  -     amitriptyline (ELAVIL) 10 mg tablet;   -     losartan-hydrochlorothiazide (HYZAAR) 100-25 MG per tablet;   -     Loratadine (CLARITIN) 10 MG CAPS; Take by mouth  -     mometasone (NASONEX) 50 mcg/act nasal spray; 2 sprays into each nostril daily  -     omeprazole (PriLOSEC) 20 mg delayed release capsule; Take 20 mg by mouth daily        Assessment/Plan:  1  Postoperative hypothyroidism:  Given occasional fatigue, will increase dose slightly to Synthroid brand 125 mcg tablets, 1 tablet 6 days of the week 1 5 tablets on Sundays  Plan to repeat TSH and free T4 in 2 months and we will call her with these results  We will plan to see her back in 1 year with labs just prior  2   Prediabetes:  Based on fasting sugar of 114 an A1c of 6 0  She will continue to be mindful of diet, exercise, lifestyle  We will continue to monitor this on an annual basis  CC:   Hypothyroidism    History of Present Illness     HPI: Betty Stuart is a 47y o  year old female with history of Hashimoto's thyroiditis, hypothyroidism due to surgery, prediabetes who presents to establish care    Previous patient of Dr Ty Herrera  In , she underwent thyroidectomy for concerns regarding thyroid cancer  Fortunately, pathology came back benign  She has then been maintained on Synthroid brand 125 mcg daily with an extra half tablet 2 days of the month for replacement purposes  Overall, she feels okay  She takes her medication appropriately  She does note occasional fatigue but does admit to a busy life  She denies any neck compressive symptoms or concerns in that regard  She also has a history of prediabetes and recent blood work suggested the same  She denies history of overt diabetes or gestational diabetes  Review of Systems   Constitutional: Positive for fatigue (occasionally)  Negative for chills and fever  HENT: Negative for trouble swallowing and voice change  Eyes: Negative for visual disturbance  Respiratory: Negative for shortness of breath  Cardiovascular: Negative for chest pain, palpitations and leg swelling  Gastrointestinal: Negative for abdominal pain, nausea and vomiting  Endocrine: Negative for cold intolerance, heat intolerance, polydipsia and polyuria  Musculoskeletal: Negative for arthralgias and myalgias  Skin: Negative for rash  Neurological: Negative for dizziness, tremors and weakness  Hematological: Negative for adenopathy  Psychiatric/Behavioral: Negative for agitation and confusion  Historical Information   Past Medical History:   Diagnosis Date    HTN (hypertension)      Past Surgical History:   Procedure Laterality Date     SECTION      THYROIDECTOMY      THYROIDECTOMY       Social History   History   Alcohol Use    Yes     Comment: ocassionally     History   Drug Use No     History   Smoking Status    Current Every Day Smoker    Types: Cigarettes   Smokeless Tobacco    Never Used     Comment: 6 cigarettes per day        Family History:   Family History   Problem Relation Age of Onset    Hypertension Mother     Ovarian cancer Mother     Hypothyroidism Mother     Hypertension Father     Hyperlipidemia Father     Heart disease Father     Hypertension Sister     Hypertension Brother     Hypertension Daughter        Meds/Allergies   Current Outpatient Prescriptions   Medication Sig Dispense Refill    amitriptyline (ELAVIL) 10 mg tablet       Loratadine (CLARITIN) 10 MG CAPS Take by mouth      losartan-hydrochlorothiazide (HYZAAR) 100-25 MG per tablet       mometasone (NASONEX) 50 mcg/act nasal spray 2 sprays into each nostril daily      omeprazole (PriLOSEC) 20 mg delayed release capsule Take 20 mg by mouth daily      SYNTHROID 125 MCG tablet TAKE 1 TABLET DAILY 6 days of the week and 1 5 tabs the 7th day  BRAND NECESSARY  96 tablet 0     No current facility-administered medications for this visit  No Known Allergies    Objective   Vitals: Blood pressure 164/90, pulse 88, height 5' 5" (1 651 m), weight 76 kg (167 lb 9 6 oz)  Invasive Devices          No matching active lines, drains, or airways          Physical Exam   Constitutional: She is oriented to person, place, and time  She appears well-developed and well-nourished  No distress  HENT:   Head: Normocephalic and atraumatic  Eyes: Conjunctivae and EOM are normal  Pupils are equal, round, and reactive to light  Neck: Normal range of motion  Neck supple  Thyromegaly: no nodules in thyroid bed  Cardiovascular: Normal rate and regular rhythm  No murmur heard  Pulmonary/Chest: Effort normal and breath sounds normal    Abdominal: Soft  Bowel sounds are normal  She exhibits no distension  There is no tenderness  Musculoskeletal: Normal range of motion  She exhibits no edema  Lymphadenopathy:     She has no cervical adenopathy  Neurological: She is alert and oriented to person, place, and time  She exhibits normal muscle tone  Skin: Skin is warm and dry  No rash noted  She is not diaphoretic  Psychiatric: She has a normal mood and affect   Her behavior is normal        The history was obtained from the review of the chart and from the patient  Lab Results:      Recent Results (from the past 03004 hour(s))   ECG 12 lead    Collection Time: 01/20/18  9:58 PM   Result Value Ref Range    Ventricular Rate 60 BPM    Atrial Rate 60 BPM    NM Interval 144 ms    QRSD Interval 82 ms    QT Interval 472 ms    QTC Interval 472 ms    P [place] 13 degrees    QRS Axis 25 degrees    T Wave Axis 8 degrees   POCT Chem 8+    Collection Time: 01/20/18 10:09 PM   Result Value Ref Range    SODIUM, I-STAT 139 136 - 145 mmol/l    Potassium, i-STAT 3 9 3 5 - 5 3 mmol/L    Chloride, istat 102 100 - 108 mmol/L    CO2, i-STAT 28 21 - 32 mmol/L    Anion Gap, Istat 15 (H) 4 - 13 mmol/L    Calcium, Ionized i-STAT 1 08 (L) 1 12 - 1 32 mmol/L    BUN, I-STAT 9 5 - 25 mg/dl    Creatinine, i-STAT 0 6 0 6 - 1 3 mg/dl    eGFR 104 ml/min/1 73sq m    Glucose, i-STAT 104 65 - 140 mg/dl    Hct, i-STAT 41 34 8 - 46 1 %    Hgb, i-STAT 13 9 11 5 - 15 4 g/dl    Specimen Type VENOUS    POCT troponin    Collection Time: 01/20/18 10:11 PM   Result Value Ref Range    POC Troponin I 0 01 0 00 - 0 08 ng/ml    Specimen Type VENOUS    Fingerstick Glucose (POCT)    Collection Time: 01/22/18 10:29 AM   Result Value Ref Range    POC Glucose 116 65 - 140 mg/dl     Labs from [address] [place] [address] 7/10/18:  Glucose 114, BUN 12, creatinine 0 7, GFR 99, sodium 140, potassium 4 1, calcium 9 6, albumin 4 4, alk phos 76, AST 19, ALT 29, total cholesterol 190, , triglycerides 174, HDL 43, TSH 2 83, total T4 8 0, free thyroxine index 2 3, A1c 6 0      Future Appointments  Date Time Provider Megan Zavala   7/30/2019 10:10 AM Edi Robles DO [facility] No

## 2024-03-23 NOTE — TELEPHONE ENCOUNTER
Called pt back, she had her US done 2/4 and requested results  Obtained from [facility] and briefly reviewed with her  This test was ordered at her 7/12/22 ov  She is requesting Dr ORTIZ review and provide recommedations  Her labwork was also done 1/18/23   [phone]    Records obtained, scanned to [facility] and placed on your desk for review 
Patient call stating that she has her Ultrasound at [facility] and she would like to over of the results 
Universal Safety Interventions

## 2024-05-05 DIAGNOSIS — E03.9 HYPOTHYROIDISM, UNSPECIFIED TYPE: ICD-10-CM

## 2024-05-05 RX ORDER — LEVOTHYROXINE SODIUM 0.12 MG/1
TABLET ORAL
Qty: 32 TABLET | Refills: 5 | Status: SHIPPED | OUTPATIENT
Start: 2024-05-05

## 2024-05-07 LAB
CREAT ?TM UR-SCNC: 192.8 UMOL/L
EXT ALBUMIN URINE RANDOM: 10.5
MICROALBUMIN/CREAT UR: 5 MG/G{CREAT}

## 2024-06-12 ENCOUNTER — OFFICE VISIT (OUTPATIENT)
Dept: GASTROENTEROLOGY | Facility: CLINIC | Age: 59
End: 2024-06-12
Payer: COMMERCIAL

## 2024-06-12 VITALS
WEIGHT: 205 LBS | DIASTOLIC BLOOD PRESSURE: 76 MMHG | SYSTOLIC BLOOD PRESSURE: 136 MMHG | BODY MASS INDEX: 34.16 KG/M2 | HEIGHT: 65 IN

## 2024-06-12 DIAGNOSIS — K21.9 GASTROESOPHAGEAL REFLUX DISEASE WITHOUT ESOPHAGITIS: ICD-10-CM

## 2024-06-12 DIAGNOSIS — R79.89 ELEVATED LFTS: ICD-10-CM

## 2024-06-12 DIAGNOSIS — K76.0 FATTY LIVER DISEASE, NONALCOHOLIC: Primary | ICD-10-CM

## 2024-06-12 DIAGNOSIS — Z12.11 SCREENING FOR COLON CANCER: ICD-10-CM

## 2024-06-12 PROBLEM — Z86.0100 PERSONAL HISTORY OF COLONIC POLYPS: Status: RESOLVED | Noted: 2020-03-17 | Resolved: 2024-06-12

## 2024-06-12 PROBLEM — Z86.010 PERSONAL HISTORY OF COLONIC POLYPS: Status: RESOLVED | Noted: 2020-03-17 | Resolved: 2024-06-12

## 2024-06-12 PROCEDURE — 99214 OFFICE O/P EST MOD 30 MIN: CPT | Performed by: INTERNAL MEDICINE

## 2024-06-12 NOTE — PROGRESS NOTES
[facility] - Outpatient Follow-up Note  Wendi Young 58 y.o. female MRN: [id number]  Encounter: [id number]    ASSESSMENT AND PLAN:      1. Fatty liver disease, nonalcoholic  -With documented fatty liver.  Probably related to elevated BMI.  Patient with elasticity study February 2023 which showed low level of stiffness.  Fib 4 studies also favorable  -Recent LFTs normal except for elevated ALT in the 70s nearly 2 times the range.  -Gradual weight loss advised.  If patient could lose 5% of body weight which would be 10 pounds this would probably help the situation    2. Elevated LFTs  --Patient with mild elevation of her ALT in May.  Previous studies revealed favorable elasticity study February 2023 and favorable FibroSure and fib 4    3. Screening for colon cancer  -Date with screening.  Last colonoscopy 2020.  She had 2 hyperplastic polyps no adenoma.  Would be due again 2030 less symptoms intervene      4. BMI - 34.0- 34.9  -Patient's weight is 205.  Some central obesity on exam.  Was 16 last year at this time after she had lost 20 pounds prior to that visit  -Gradual weight loss advised.  Weight watchers or other program along with exercise cardiovascular and and low weight resistance    5. GERD -without esophagitis  -Negative EGD in 2020.  Symptoms controlled with pantoprazole      Followup Appointment: 1 year   ______________________________________________________________________    Chief Complaint   Patient presents with    Follow-up     HPI: Returns to the office for follow-up with respect to her abnormal liver function studies.  Most recent LFTs revealed elevation of the ALT and May to 72.  Patient's lab studies have fluctuated around this area.  Previous ultrasonography in February 2024 revealed fatty liver.  She had an elasticity study which showed mild stiffness.  Fib 4 and FibroSure studies indicated low levels of fibrosis.  Patient denies any problems with  abdominal pain.  She does not drink alcohol.  Unfortunately she reports her  likes desserts and they have been eating a little bit more as of late.  She gained about 30 pounds since her last visit last year.  When we saw her in  of last year she was 170 pounds.  She is not on any organized exercise type program.  She does walk her dog daily.  Patient is up-to-date with respect to colon cancer screening.  Last examination .  She had 2 hyperplastic polyps.  Her dad had colon cancer but was diagnosed in his mid 80s.  Patient has a history of GERD.  No esophagitis by EGD.  Symptoms controlled with pantoprazole    Historical Information   Past Medical History:   Diagnosis Date    Anemia     Anxiety     Bipolar 1 disorder (HCC)     Colon polyp     GERD (gastroesophageal reflux disease)     HTN (hypertension)     Hyperlipidemia     Hypertension     Hypothyroidism      Past Surgical History:   Procedure Laterality Date     SECTION       SECTION      HYSTERECTOMY      THYROIDECTOMY      THYROIDECTOMY      TOTAL ABDOMINAL HYSTERECTOMY W/ BILATERAL SALPINGOOPHORECTOMY      UPPER GASTROINTESTINAL ENDOSCOPY       Social History     Substance and Sexual Activity   Alcohol Use Yes    Comment: ocassionally     Social History     Substance and Sexual Activity   Drug Use No     Social History     Tobacco Use   Smoking Status Every Day    Current packs/day: 0.25    Average packs/day: 0.3 packs/day for 20.0 years (5.0 ttl pk-yrs)    Types: Cigarettes   Smokeless Tobacco Never   Tobacco Comments    3-6 cigarettes per day.      Family History   Problem Relation Age of Onset    Hypertension Mother     Ovarian cancer Mother     Hypothyroidism Mother     Hypertension Father     Hyperlipidemia Father     Heart disease Father     Colon cancer Father         Rectal cancer    Rectal cancer Father     Hypertension Sister     Hypertension Brother     Hypertension Daughter     Hypertension Sister     Hypertension  "Brother     No Known Problems Brother     No Known Problems Brother     Colon polyps Neg Hx          Current Outpatient Medications:     albuterol (ProAir HFA) 90 mcg/act inhaler    amLODIPine (Norvasc) 5 mg tablet    cholecalciferol (VITAMIN D3) 1,000 units tablet    gabapentin (NEURONTIN) 400 mg capsule    hydrochlorothiazide (HYDRODIURIL) 25 mg tablet    lamoTRIgine (LaMICtal) 25 mg tablet    levothyroxine (Synthroid) 125 mcg tablet    losartan (COZAAR) 100 MG tablet    Multiple Vitamin (MULTIVITAMIN) capsule    Omega-3 Fatty Acids (FISH OIL) 1,000 mg    pantoprazole (PROTONIX) 40 mg tablet    psyllium (METAMUCIL) 58.6 % packet    Thiamine HCl (VITAMIN B1 PO)  Allergies   Allergen Reactions    Lisinopril Cough     Reviewed medications and allergies and updated as indicated    PHYSICAL EXAM:    Blood pressure 136/76, height 5' 5\" (1.651 m), weight 93 kg (205 lb). Body mass index is 34.11 kg/m².  General Appearance: NAD, cooperative, alert  Eyes: Anicteric, conjunctiva pink  ENT:  Normocephalic, atraumatic, normal mucosa.    Neck:  Supple, symmetrical, trachea midline  Resp:  Clear to auscultation bilaterally; no rales, rhonchi or wheezing; respirations unlabored   CV:  S1 S2, Regular rate and rhythm; no murmur, rub, or gallop.  GI:  Soft, non-tender, non-distended; normal bowel sounds; no masses, no organomegaly -mild central obesity  Rectal: Deferred  Musculoskeletal: No cyanosis, clubbing or edema. Normal ROM.  Skin:  No jaundice, positive for contact dermatitis over the arms (poison ivy)  -No spider angiomata or palmar erythema H  Heme/Lymph: No palpable cervical lymphadenopathy  Psych: Normal affect, good eye contact  Neuro: No gross deficits, AAOx3    Lab Results:   Lab Results   Component Value Date    WBC 8.82 12/13/2022    HGB 15.3 12/13/2022    HCT 45.1 12/13/2022    MCV 90 12/13/2022     01/18/2024     Lab Results   Component Value Date    K 3.9 08/31/2023     08/31/2023    CO2 21 08/31/2023 "    BUN 13 08/31/2023    CREATININE 0.58 08/31/2023    GLUCOSE 104 01/20/2018    CALCIUM 9.9 12/13/2022    AST 31 01/18/2024    ALT 56 (H) 01/18/2024    ALKPHOS 92 12/13/2022    EGFR 105 08/31/2023

## 2024-06-12 NOTE — PATIENT INSTRUCTIONS
[facility] - Outpatient Follow-up Note  Wendi Young 58 y.o. female MRN: [id number]  Encounter: [id number]    ASSESSMENT AND PLAN:      1. Fatty liver disease, nonalcoholic  -With documented fatty liver.  Probably related to elevated BMI.  Patient with elasticity study February 2023 which showed low level of stiffness.  Fib 4 studies also favorable  -Recent LFTs normal except for elevated ALT in the 70s nearly 2 times the range.  -Gradual weight loss advised.  If patient could lose 5% of body weight which would be 10 pounds this would probably help the situation    2. Elevated LFTs  --Patient with mild elevation of her ALT in May.  Previous studies revealed favorable elasticity study February 2023 and favorable FibroSure and fib 4    3. Screening for colon cancer  -Date with screening.  Last colonoscopy 2020.  She had 2 hyperplastic polyps no adenoma.  Would be due again 2030 less symptoms intervene      4. BMI - 34.0- 34.9  -Patient's weight is 205.  Some central obesity on exam.  Was 16 last year at this time after she had lost 20 pounds prior to that visit  -Gradual weight loss advised.  Weight watchers or other program along with exercise cardiovascular and and low weight resistance    5. GERD -without esophagitis  -Negative EGD in 2020.  Symptoms controlled with pantoprazole      Followup Appointment: 1 year

## 2024-09-20 LAB
25(OH)D3+25(OH)D2 SERPL-MCNC: 39.7 NG/ML (ref 30–100)
ALBUMIN SERPL-MCNC: 4.4 G/DL (ref 3.8–4.9)
ALP SERPL-CCNC: 103 IU/L (ref 44–121)
ALT SERPL-CCNC: 95 IU/L (ref 0–32)
AST SERPL-CCNC: 54 IU/L (ref 0–40)
BASOPHILS # BLD AUTO: 0 X10E3/UL (ref 0–0.2)
BASOPHILS NFR BLD AUTO: 1 %
BILIRUB SERPL-MCNC: 0.2 MG/DL (ref 0–1.2)
BUN SERPL-MCNC: 11 MG/DL (ref 6–24)
BUN/CREAT SERPL: 14 (ref 9–23)
CALCIUM SERPL-MCNC: 9.2 MG/DL (ref 8.7–10.2)
CHLORIDE SERPL-SCNC: 105 MMOL/L (ref 96–106)
CHOLEST SERPL-MCNC: 205 MG/DL (ref 100–199)
CHOLEST/HDLC SERPL: 5.5 RATIO (ref 0–4.4)
CO2 SERPL-SCNC: 24 MMOL/L (ref 20–29)
CREAT SERPL-MCNC: 0.76 MG/DL (ref 0.57–1)
EGFR: 90 ML/MIN/1.73
EOSINOPHIL # BLD AUTO: 0.2 X10E3/UL (ref 0–0.4)
EOSINOPHIL NFR BLD AUTO: 3 %
ERYTHROCYTE [DISTWIDTH] IN BLOOD BY AUTOMATED COUNT: 12.9 % (ref 11.7–15.4)
EST. AVERAGE GLUCOSE BLD GHB EST-MCNC: 143 MG/DL
GLOBULIN SER-MCNC: 2.3 G/DL (ref 1.5–4.5)
GLUCOSE SERPL-MCNC: 122 MG/DL (ref 70–99)
HBA1C MFR BLD: 6.6 % (ref 4.8–5.6)
HCT VFR BLD AUTO: 43.4 % (ref 34–46.6)
HDLC SERPL-MCNC: 37 MG/DL
HGB BLD-MCNC: 14.3 G/DL (ref 11.1–15.9)
IMM GRANULOCYTES # BLD: 0 X10E3/UL (ref 0–0.1)
IMM GRANULOCYTES NFR BLD: 0 %
LDLC SERPL CALC-MCNC: 139 MG/DL (ref 0–99)
LYMPHOCYTES # BLD AUTO: 1.6 X10E3/UL (ref 0.7–3.1)
LYMPHOCYTES NFR BLD AUTO: 25 %
MCH RBC QN AUTO: 30.8 PG (ref 26.6–33)
MCHC RBC AUTO-ENTMCNC: 32.9 G/DL (ref 31.5–35.7)
MCV RBC AUTO: 93 FL (ref 79–97)
MONOCYTES # BLD AUTO: 0.5 X10E3/UL (ref 0.1–0.9)
MONOCYTES NFR BLD AUTO: 8 %
NEUTROPHILS # BLD AUTO: 3.9 X10E3/UL (ref 1.4–7)
NEUTROPHILS NFR BLD AUTO: 63 %
PLATELET # BLD AUTO: 252 X10E3/UL (ref 150–450)
POTASSIUM SERPL-SCNC: 4.7 MMOL/L (ref 3.5–5.2)
PROT SERPL-MCNC: 6.7 G/DL (ref 6–8.5)
RBC # BLD AUTO: 4.65 X10E6/UL (ref 3.77–5.28)
SL AMB VLDL CHOLESTEROL CALC: 29 MG/DL (ref 5–40)
SODIUM SERPL-SCNC: 144 MMOL/L (ref 134–144)
T4 FREE SERPL-MCNC: 1.49 NG/DL (ref 0.82–1.77)
TRIGL SERPL-MCNC: 162 MG/DL (ref 0–149)
TSH SERPL DL<=0.005 MIU/L-ACNC: 1.69 UIU/ML (ref 0.45–4.5)
WBC # BLD AUTO: 6.2 X10E3/UL (ref 3.4–10.8)

## 2024-10-31 ENCOUNTER — OFFICE VISIT (OUTPATIENT)
Dept: ENDOCRINOLOGY | Facility: HOSPITAL | Age: 59
End: 2024-10-31
Payer: COMMERCIAL

## 2024-10-31 VITALS
HEART RATE: 80 BPM | DIASTOLIC BLOOD PRESSURE: 76 MMHG | HEIGHT: 65 IN | SYSTOLIC BLOOD PRESSURE: 130 MMHG | OXYGEN SATURATION: 96 % | WEIGHT: 209 LBS | BODY MASS INDEX: 34.82 KG/M2

## 2024-10-31 DIAGNOSIS — E78.5 HYPERLIPIDEMIA, UNSPECIFIED HYPERLIPIDEMIA TYPE: ICD-10-CM

## 2024-10-31 DIAGNOSIS — E89.0 POSTOPERATIVE HYPOTHYROIDISM: ICD-10-CM

## 2024-10-31 DIAGNOSIS — E55.9 VITAMIN D DEFICIENCY: ICD-10-CM

## 2024-10-31 DIAGNOSIS — R73.03 PREDIABETES: ICD-10-CM

## 2024-10-31 DIAGNOSIS — E11.65 TYPE 2 DIABETES MELLITUS WITH HYPERGLYCEMIA, WITHOUT LONG-TERM CURRENT USE OF INSULIN (HCC): ICD-10-CM

## 2024-10-31 DIAGNOSIS — E03.9 HYPOTHYROIDISM, UNSPECIFIED TYPE: Primary | ICD-10-CM

## 2024-10-31 PROCEDURE — 99214 OFFICE O/P EST MOD 30 MIN: CPT | Performed by: NURSE PRACTITIONER

## 2024-10-31 RX ORDER — GABAPENTIN 600 MG/1
600 TABLET ORAL
COMMUNITY
Start: 2024-10-05

## 2024-10-31 RX ORDER — METFORMIN HYDROCHLORIDE 500 MG/1
500 TABLET, EXTENDED RELEASE ORAL
Qty: 90 TABLET | Refills: 3 | Status: SHIPPED | OUTPATIENT
Start: 2024-10-31

## 2024-10-31 RX ORDER — HYDROCHLOROTHIAZIDE 12.5 MG/1
1 TABLET ORAL DAILY
COMMUNITY
Start: 2024-10-13

## 2024-10-31 NOTE — PATIENT INSTRUCTIONS
Be mindful of diet.     Stay active and stay hydrated.     For now, continue Synthroid 125 mcg - 7 days of the week.    Start Metformin  mg once daily with dinner.     Complete lab work as prescribed.     Contact the office with any change in symptoms.     Discuss cholesterol levels with PCP, given ongoing testing through GI.      Continue Losartan.

## 2024-10-31 NOTE — PROGRESS NOTES
Wendi Young 58 y.o. female MRN: [id number]    Encounter: [id number]      Assessment & Plan     Assessment:  This is a 58 y.o.-year-old female with postoperative hypothyroidism and prediabetes.     Plan:  1. Hypothyroidism postoperative: Recent TSH and free T4 are normal.  For now, she will continue levothyroxine 125 mcg daily.  TSH and free T4 prior to next office visit.  2.Type 2 Diabetes:  Most recent hemoglobin A1c is elevated to 6.6.  Discussed the difference between prediabetes and type 2 diabetes.  She will start metformin  mg with dinner.  Encouraged diet, exercise and lifestyle modification.  Follow-up with diabetes education for MNT.  Check CMP, A1c, lipid panel before next appointment=.  3. Vitamin-D deficiency:  Continue supplementation.  4. Hyperlipidemia:  Discussed the impact of a proper diet and exercise and the need for medication.  She will discuss with her PCP.  5. Hypertension: Normotensive in the office today. Continue Losartan.  Check comprehensive metabolic panel prior to next visit.    CC: Hypothyroidism follow up    History of Present Illness     HPI:  58 y.o. year old female with history of hypothyroidism after surgery, prediabetes who presents for a follow-up appointment.  In 2010, she underwent surgery for nodules and fortunately pathology came back benign.  She is treated with Synthroid brand 125 mcg daily.  Her most recent TSH from September 19, 2024 is 1.69 with free T4 of 1.49.  No neck compressive symptoms.  She presents today overall feeling well.      For her prediabetes, her most recent hemoglobin A1c is 6.6 from September 19, 2024 with a fasting blood sugar of 122.     For her vitamin-D deficiency, she supplements with 2000 units of vitamin D3 daily.  Most recent 25 hydroxy vitamin-D level is 39.7.    Review of Systems   Constitutional: Negative.  Negative for chills, fatigue and fever.   HENT: Negative.  Negative for trouble swallowing and voice change.    Eyes:  Negative.  Negative for photophobia, pain, discharge, redness, itching and visual disturbance.   Respiratory: Negative.  Negative for chest tightness and shortness of breath.    Cardiovascular: Negative.  Negative for chest pain.   Gastrointestinal: Negative.  Negative for abdominal pain, constipation, diarrhea and vomiting.   Endocrine: Negative for cold intolerance, heat intolerance, polydipsia, polyphagia and polyuria.   Genitourinary: Negative.    Musculoskeletal: Negative.    Skin: Negative.    Allergic/Immunologic: Negative.    Neurological: Negative.  Negative for dizziness, syncope, light-headedness and headaches.   Hematological: Negative.    Psychiatric/Behavioral: Negative.     All other systems reviewed and are negative.      Historical Information   Past Medical History:   Diagnosis Date    Anemia     Anxiety     Bipolar 1 disorder (HCC)     GERD (gastroesophageal reflux disease)     HTN (hypertension)     Hyperlipidemia     Hypertension     Hypothyroidism      Past Surgical History:   Procedure Laterality Date     SECTION       SECTION      HYSTERECTOMY      THYROIDECTOMY      THYROIDECTOMY      TOTAL ABDOMINAL HYSTERECTOMY W/ BILATERAL SALPINGOOPHORECTOMY      UPPER GASTROINTESTINAL ENDOSCOPY       Social History   Social History     Substance and Sexual Activity   Alcohol Use Yes    Comment: ocassionally     Social History     Substance and Sexual Activity   Drug Use No     Social History     Tobacco Use   Smoking Status Every Day    Current packs/day: 0.25    Average packs/day: 0.3 packs/day for 20.0 years (5.0 ttl pk-yrs)    Types: Cigarettes   Smokeless Tobacco Never   Tobacco Comments    3-6 cigarettes per day.      Family History:   Family History   Problem Relation Age of Onset    Hypertension Mother     Ovarian cancer Mother     Hypothyroidism Mother     Hypertension Father     Hyperlipidemia Father     Heart disease Father     Colon cancer Father         Rectal cancer     Rectal cancer Father     Hypertension Sister     Hypertension Brother     Hypertension Daughter     Hypertension Sister     Hypertension Brother     No Known Problems Brother     No Known Problems Brother     Colon polyps Neg Hx        Meds/Allergies   Current Outpatient Medications   Medication Sig Dispense Refill    albuterol (ProAir HFA) 90 mcg/act inhaler Inhale 2 puffs every 6 (six) hours as needed for wheezing      amLODIPine (Norvasc) 5 mg tablet Take 5 mg by mouth daily      amoxicillin-clavulanate (AUGMENTIN) 875-125 mg per tablet Take 1 tablet by mouth 2 (two) times a day      cholecalciferol (VITAMIN D3) 1,000 units tablet Take 2,000 Units by mouth daily      gabapentin (NEURONTIN) 600 MG tablet Take 600 mg by mouth daily at bedtime      hydroCHLOROthiazide 12.5 mg tablet Take 1 tablet by mouth in the morning      lamoTRIgine (LaMICtal) 25 mg tablet Take 25 mg by mouth daily      levothyroxine (Synthroid) 125 mcg tablet TAKE 1 TABLET 6 DAYS OF THE WEEK, TAKE ONE AND ONE HALF TABLETS ON SUNDAYS (Patient taking differently: TAKE 1 TABLET Daily) 32 tablet 5    losartan (COZAAR) 100 MG tablet Take 100 mg by mouth daily      Multiple Vitamin (MULTIVITAMIN) capsule Take 1 capsule by mouth daily      Omega-3 Fatty Acids (FISH OIL) 1,000 mg Take 1,000 mg by mouth daily      pantoprazole (PROTONIX) 40 mg tablet TAKE 1 TABLET BY MOUTH EVERY DAY 30 tablet 3    psyllium (METAMUCIL) 58.6 % packet Take 1 packet by mouth daily      Thiamine HCl (VITAMIN B1 PO) Take 1,000 mg by mouth daily      gabapentin (NEURONTIN) 400 mg capsule Take 400 mg by mouth daily at bedtime (Patient not taking: Reported on 10/31/2024)      hydrochlorothiazide (HYDRODIURIL) 25 mg tablet Take 25 mg by mouth daily (Patient not taking: Reported on 10/31/2024)       No current facility-administered medications for this visit.     Allergies   Allergen Reactions    Lisinopril Cough       Objective   Vitals: Blood pressure 130/76, pulse 80, height 5'  "5\" (1.651 m), weight 94.8 kg (209 lb), SpO2 96%.    Physical Exam  Vitals reviewed.   Constitutional:       Appearance: She is well-developed.   HENT:      Head: Normocephalic and atraumatic.   Eyes:      Conjunctiva/sclera: Conjunctivae normal.      Pupils: Pupils are equal, round, and reactive to light.   Cardiovascular:      Rate and Rhythm: Normal rate and regular rhythm.      Heart sounds: Normal heart sounds.   Pulmonary:      Effort: Pulmonary effort is normal.      Breath sounds: Normal breath sounds.   Abdominal:      General: Bowel sounds are normal.      Palpations: Abdomen is soft.   Musculoskeletal:         General: Normal range of motion.      Cervical back: Normal range of motion and neck supple.   Skin:     General: Skin is warm and dry.   Neurological:      Mental Status: She is alert and oriented to person, place, and time.   Psychiatric:         Behavior: Behavior normal.         Thought Content: Thought content normal.         Judgment: Judgment normal.         Lab Results:   Lab Results   Component Value Date/Time    Free t4 1.49 09/19/2024 09:08 AM    Free t4 1.55 01/18/2024 10:02 AM       Portions of the record may have been created with voice recognition software. Occasional wrong word or \"sound a like\" substitutions may have occurred due to the inherent limitations of voice recognition software. Read the chart carefully and recognize, using context, where substitutions have occurred.    "

## 2024-11-11 DIAGNOSIS — E03.9 HYPOTHYROIDISM, UNSPECIFIED TYPE: ICD-10-CM

## 2024-11-12 RX ORDER — LEVOTHYROXINE SODIUM 125 UG/1
TABLET ORAL
Qty: 32 TABLET | Refills: 5 | Status: SHIPPED | OUTPATIENT
Start: 2024-11-12

## 2024-11-19 ENCOUNTER — OFFICE VISIT (OUTPATIENT)
Dept: DIABETES SERVICES | Facility: HOSPITAL | Age: 59
End: 2024-11-19
Payer: COMMERCIAL

## 2024-11-19 VITALS — HEIGHT: 65 IN | BODY MASS INDEX: 34.26 KG/M2 | WEIGHT: 205.6 LBS

## 2024-11-19 DIAGNOSIS — E11.65 TYPE 2 DIABETES MELLITUS WITH HYPERGLYCEMIA, WITHOUT LONG-TERM CURRENT USE OF INSULIN (HCC): Primary | ICD-10-CM

## 2024-11-19 PROCEDURE — 97802 MEDICAL NUTRITION INDIV IN: CPT | Performed by: DIETITIAN, REGISTERED

## 2024-11-19 NOTE — PROGRESS NOTES
"Medical Nutrition Therapy     Assessment    Visit Type: Initial visit  Chief complaint:  Type 2 Diabetes     HPI:  Met with Wendi for initial MNT. New type 2 diabetes, A1C 6.6%.  Since dx: has been looking at labels more, has been reducing portions, drinking more water. Food recall shows primary issues: changes she has been making are good ones. Discussed the benefit of consistent carb intake for steady blood sugars. No need to remove certain foods entirely.  Together we discussed what foods contain CHO, reading a food label, serving sizes, and set a carb goal of 30-45g CHO/meal to promote weight loss with 15g snacks. Put together sample meals for Wendi's reference and evaluated Wendi's current eating plan. Good understanding, Wendi will call with questions or for more education. Follow up as needed if not improving.      Ht Readings from Last 1 Encounters:   11/19/24 5' 5\" (1.651 m)     Wt Readings from Last 3 Encounters:   11/19/24 94.8 kg (209 lb)   10/31/24 94.8 kg (209 lb)   06/12/24 93 kg (205 lb)        Body mass index is 34.78 kg/m².     Lab Results   Component Value Date    HGBA1C 6.6 (H) 09/19/2024    HGBA1C 5.9 (H) 08/31/2023    HGBA1C 6.1 (H) 08/25/2022       No results found for: \"CHOL\"  Lab Results   Component Value Date    HDL 37 (L) 09/19/2024    HDL 39 (L) 08/31/2023    HDL 43 01/18/2023     Lab Results   Component Value Date    LDLCALC 139 (H) 09/19/2024    LDLCALC 135 (H) 08/31/2023    LDLCALC 153 (H) 01/18/2023     Lab Results   Component Value Date    TRIG 162 (H) 09/19/2024    TRIG 199 (H) 08/31/2023    TRIG 134 01/18/2023     Lab Results   Component Value Date    CHOLHDL 5.5 (H) 09/19/2024    CHOLHDL 5.4 (H) 08/31/2023    CHOLHDL 5.1 (H) 01/18/2023       Weight Change: Yes a few pounds    Medical Diagnosis/ICD 10 Code:  E11.65    Barriers to Learning: no barriers    Do you follow any special diet presently?: No  Who shops: patient and spouse  Who cooks: patient and spouse    Food Log: " Completed via the method of food recall- on disability     Breakfast:up around 7am, eats around 8:30am. Yogurt with fruit and nuts or granola but stopped that now doing nuts, cristants from  joes, oat cereal with milk. Eggs, weekends out with daughter and gets omelet meal half the potatoes and 1 toast   Morning Snack:not much veggie straws   Lunch:12ish: salads, leftovers, chinese, not much sandwich, no soups.   Afternoon Snack: pepperoni and cheese, grapes  Dinner:later recently 7:30, doing better recently since  had heart attach. Lots of fish from them fishing. Meat starch veggie hot meal. Smaller portion of starch.   Evening Snack: dessert is big with her - cookies or brownies or apple pies, doing smaller portion now. 1hr after dinner right before bed. Bedtime 10pm   Beverages: water with some flavor unknown if sugared, green tea iced gallon regular, no soda, fruit juice none, no milk rare, coffee with creamer in the middle of the night and in the morning 2-3 a day with flavored creamer.   Eating out/Take out: twice on the weekends, christian sausage egg and cheese on english muffin or bagel.   Exercise : ADL, walks the dog a block twice a day, going to start doing more soon she says    Nutrition Diagnosis:  Food and nutrition related knowledge deficit  related to Lack of prior exposure to accurate nutrition related information as evidenced by Verbalizes inaccurate or incomplete information    Intervention: plate method, label reading, behavior modification strategies, carbohydrate counting, meal planning, individualized meal plan, and monitoring portion control     Treatment Goals: Patient understands education and recommendations    Education Material Given  Wendi was provided the Portion Booklet and Planning Healthy Meals     Monitoring and evaluation:    Term code indicator  FH 1.6.3 Carbohydrate Intake Criteria: 30-45g CHO per meal, 0-15g CHO snacks    Patient’s Response to  Instruction:  Comprehensiongood  Motivationgood  Expected Compliancegood    Thank you for coming to the [facility] for education today.  Please feel free to call with any questions or concerns.    Karina Merchant, RD  [address]  [facility]  [place] [id number]

## 2025-02-05 ENCOUNTER — RESULTS FOLLOW-UP (OUTPATIENT)
Dept: ENDOCRINOLOGY | Facility: HOSPITAL | Age: 60
End: 2025-02-05

## 2025-02-05 LAB
ALBUMIN SERPL-MCNC: 4.3 G/DL (ref 3.8–4.9)
ALP SERPL-CCNC: 102 IU/L (ref 44–121)
ALT SERPL-CCNC: 54 IU/L (ref 0–32)
AST SERPL-CCNC: 29 IU/L (ref 0–40)
BASOPHILS # BLD AUTO: 0 X10E3/UL (ref 0–0.2)
BASOPHILS NFR BLD AUTO: 1 %
BILIRUB SERPL-MCNC: 0.4 MG/DL (ref 0–1.2)
BUN SERPL-MCNC: 13 MG/DL (ref 8–27)
BUN/CREAT SERPL: 20 (ref 12–28)
CALCIUM SERPL-MCNC: 9.4 MG/DL (ref 8.7–10.3)
CHLORIDE SERPL-SCNC: 103 MMOL/L (ref 96–106)
CHOLEST SERPL-MCNC: 207 MG/DL (ref 100–199)
CHOLEST/HDLC SERPL: 5.3 RATIO (ref 0–4.4)
CO2 SERPL-SCNC: 26 MMOL/L (ref 20–29)
CREAT SERPL-MCNC: 0.66 MG/DL (ref 0.57–1)
EGFR: 100 ML/MIN/1.73
EOSINOPHIL # BLD AUTO: 0.2 X10E3/UL (ref 0–0.4)
EOSINOPHIL NFR BLD AUTO: 2 %
ERYTHROCYTE [DISTWIDTH] IN BLOOD BY AUTOMATED COUNT: 12.6 % (ref 11.7–15.4)
EST. AVERAGE GLUCOSE BLD GHB EST-MCNC: 131 MG/DL
GLOBULIN SER-MCNC: 2.1 G/DL (ref 1.5–4.5)
GLUCOSE SERPL-MCNC: 99 MG/DL (ref 70–99)
HBA1C MFR BLD: 6.2 % (ref 4.8–5.6)
HCT VFR BLD AUTO: 43 % (ref 34–46.6)
HDLC SERPL-MCNC: 39 MG/DL
HGB BLD-MCNC: 14.2 G/DL (ref 11.1–15.9)
IMM GRANULOCYTES # BLD: 0 X10E3/UL (ref 0–0.1)
IMM GRANULOCYTES NFR BLD: 0 %
LDLC SERPL CALC-MCNC: 144 MG/DL (ref 0–99)
LYMPHOCYTES # BLD AUTO: 2 X10E3/UL (ref 0.7–3.1)
LYMPHOCYTES NFR BLD AUTO: 29 %
MCH RBC QN AUTO: 30.5 PG (ref 26.6–33)
MCHC RBC AUTO-ENTMCNC: 33 G/DL (ref 31.5–35.7)
MCV RBC AUTO: 93 FL (ref 79–97)
MONOCYTES # BLD AUTO: 0.5 X10E3/UL (ref 0.1–0.9)
MONOCYTES NFR BLD AUTO: 7 %
NEUTROPHILS # BLD AUTO: 4.3 X10E3/UL (ref 1.4–7)
NEUTROPHILS NFR BLD AUTO: 61 %
PLATELET # BLD AUTO: 254 X10E3/UL (ref 150–450)
POTASSIUM SERPL-SCNC: 4.5 MMOL/L (ref 3.5–5.2)
PROT SERPL-MCNC: 6.4 G/DL (ref 6–8.5)
RBC # BLD AUTO: 4.65 X10E6/UL (ref 3.77–5.28)
SL AMB VLDL CHOLESTEROL CALC: 24 MG/DL (ref 5–40)
SODIUM SERPL-SCNC: 143 MMOL/L (ref 134–144)
T4 FREE SERPL-MCNC: 1.47 NG/DL (ref 0.82–1.77)
TRIGL SERPL-MCNC: 133 MG/DL (ref 0–149)
TSH SERPL DL<=0.005 MIU/L-ACNC: 0.78 UIU/ML (ref 0.45–4.5)
WBC # BLD AUTO: 7.1 X10E3/UL (ref 3.4–10.8)

## 2025-02-07 ENCOUNTER — RESULTS FOLLOW-UP (OUTPATIENT)
Dept: GASTROENTEROLOGY | Facility: CLINIC | Age: 60
End: 2025-02-07

## 2025-02-07 LAB
A2 MACROGLOB SERPL-MCNC: 93 MG/DL (ref 110–276)
ALBUMIN SERPL-MCNC: 4.4 G/DL (ref 3.8–4.9)
ALP SERPL-CCNC: 103 IU/L (ref 44–121)
ALT SERPL W P-5'-P-CCNC: 62 IU/L (ref 0–40)
ALT SERPL-CCNC: 54 IU/L (ref 0–32)
APO A-I SERPL-MCNC: 133 MG/DL (ref 116–209)
AST SERPL-CCNC: 30 IU/L (ref 0–40)
BILIRUB SERPL-MCNC: 0.2 MG/DL (ref 0–1.2)
BILIRUB SERPL-MCNC: 0.4 MG/DL (ref 0–1.2)
BUN SERPL-MCNC: 13 MG/DL (ref 8–27)
BUN/CREAT SERPL: 19 (ref 12–28)
CALCIUM SERPL-MCNC: 9.3 MG/DL (ref 8.7–10.3)
CHLORIDE SERPL-SCNC: 103 MMOL/L (ref 96–106)
CO2 SERPL-SCNC: 24 MMOL/L (ref 20–29)
COMMENT: ABNORMAL
CREAT SERPL-MCNC: 0.69 MG/DL (ref 0.57–1)
EGFR: 99 ML/MIN/1.73
FIBROSIS SCORING:: ABNORMAL
FIBROSIS STAGE SERPL QL: ABNORMAL
GGT SERPL-CCNC: 24 IU/L (ref 0–60)
GLOBULIN SER-MCNC: 2.1 G/DL (ref 1.5–4.5)
GLUCOSE SERPL-MCNC: 99 MG/DL (ref 70–99)
HAPTOGLOB SERPL-MCNC: 153 MG/DL (ref 33–346)
INTERPRETATIONS: ABNORMAL
LIVER FIBR SCORE SERPL CALC.FIBROSURE: 0.03 (ref 0–0.21)
NECROINFLAMM ACTIVITY SCORING:: ABNORMAL
NECROINFLAMMATORY ACT GRADE SERPL QL: ABNORMAL
NECROINFLAMMATORY ACT SCORE SERPL: 0.28 (ref 0–0.17)
POTASSIUM SERPL-SCNC: 4.2 MMOL/L (ref 3.5–5.2)
PROT SERPL-MCNC: 6.5 G/DL (ref 6–8.5)
REF LAB TEST METHOD: ABNORMAL
SERVICE CMNT-IMP: ABNORMAL
SODIUM SERPL-SCNC: 142 MMOL/L (ref 134–144)

## 2025-02-07 NOTE — RESULT ENCOUNTER NOTE
Called patient and reviewed labs.  CMP mild elevation of the ALT -the fibrosis panel F0 low fibrosis score.

## 2025-02-10 ENCOUNTER — TELEPHONE (OUTPATIENT)
Dept: GASTROENTEROLOGY | Facility: CLINIC | Age: 60
End: 2025-02-10

## 2025-02-10 NOTE — TELEPHONE ENCOUNTER
Spoke to the patient, she did receive the recall letter for her 5 year colonoscopy. She is going to [place] and will call to schedule when she is home.

## 2025-02-13 ENCOUNTER — OFFICE VISIT (OUTPATIENT)
Dept: ENDOCRINOLOGY | Facility: HOSPITAL | Age: 60
End: 2025-02-13
Payer: MEDICARE

## 2025-02-13 VITALS
HEART RATE: 81 BPM | OXYGEN SATURATION: 96 % | WEIGHT: 196.8 LBS | BODY MASS INDEX: 32.79 KG/M2 | HEIGHT: 65 IN | DIASTOLIC BLOOD PRESSURE: 80 MMHG | SYSTOLIC BLOOD PRESSURE: 120 MMHG

## 2025-02-13 DIAGNOSIS — E03.9 HYPOTHYROIDISM, UNSPECIFIED TYPE: Primary | ICD-10-CM

## 2025-02-13 DIAGNOSIS — E89.0 POSTOPERATIVE HYPOTHYROIDISM: ICD-10-CM

## 2025-02-13 DIAGNOSIS — R73.03 PREDIABETES: ICD-10-CM

## 2025-02-13 DIAGNOSIS — E78.5 HYPERLIPIDEMIA, UNSPECIFIED HYPERLIPIDEMIA TYPE: ICD-10-CM

## 2025-02-13 DIAGNOSIS — E11.65 TYPE 2 DIABETES MELLITUS WITH HYPERGLYCEMIA, WITHOUT LONG-TERM CURRENT USE OF INSULIN (HCC): ICD-10-CM

## 2025-02-13 DIAGNOSIS — E55.9 VITAMIN D DEFICIENCY: ICD-10-CM

## 2025-02-13 PROCEDURE — 99214 OFFICE O/P EST MOD 30 MIN: CPT | Performed by: NURSE PRACTITIONER

## 2025-02-13 RX ORDER — ROSUVASTATIN CALCIUM 5 MG/1
5 TABLET, COATED ORAL DAILY
Qty: 30 TABLET | Refills: 10 | Status: SHIPPED | OUTPATIENT
Start: 2025-02-13

## 2025-02-13 NOTE — PATIENT INSTRUCTIONS
Be mindful of diet.     Stay active and stay hydrated.     For now, continue Synthroid 125 mcg - 7 days of the week.     Metformin  mg once daily with dinner.     Complete lab work as prescribed.     Contact the office with any change in symptoms.     Start Crestor 5 mg daily at bedtime.     Continue Losartan.

## 2025-02-13 NOTE — PROGRESS NOTES
Wendi Young 59 y.o. female MRN: [id number]    Encounter: [id number]      Assessment & Plan     Assessment:  This is a 59 y.o.-year-old female with postoperative hypothyroidism and prediabetes.     Plan:  1. Hypothyroidism postoperative: Recent TSH and free T4 are normal.  For now, she will continue levothyroxine 125 mcg daily.  TSH and free T4 prior to next office visit.  2.Type 2 Diabetes:  Most recent hemoglobin A1c is elevated to 6.2.  Discussed the difference between prediabetes and type 2 diabetes.  She will start metformin  mg with dinner.  Encouraged diet, exercise and lifestyle modification.  Follow-up with diabetes education for MNT.  Check CMP, A1c, lipid panel before next appointment.  3. Vitamin-D deficiency:  Continue supplementation.  4. Hyperlipidemia: LDL and total cholesterol are elevated.  We discussed the benefits, risks and side effects of Crestor.  She will start Crestor 5 mg daily at bedtime.  I have asked her to call with any side effects or issues.  Discussed the impact of a proper diet and exercise and the need for medication.  She will discuss with her PCP.  5. Hypertension: Normotensive in the office today. Continue Losartan.  Check comprehensive metabolic panel prior to next visit.    CC: Hypothyroidism/ Type 2 Diabetes follow up    History of Present Illness     HPI:  59 y.o. year old female with history of hypothyroidism after surgery, prediabetes who presents for a follow-up appointment.  In 2010, she underwent surgery for nodules and fortunately pathology came back benign.  She is treated with Synthroid brand 125 mcg daily.  Her most recent TSH from February 4, 2025 is 0.777 with free T4 of 1.47.  No neck compressive symptoms.  She presents today overall feeling well.      For her prediabetes, her most recent hemoglobin A1c is 6.2 from February 4, 2025 with a fasting blood sugar of 99.     For her vitamin-D deficiency, she supplements with 2000 units of vitamin D3  daily.  Most recent 25 hydroxy vitamin-D level is 39.7.     Review of Systems   Constitutional: Negative.  Negative for chills, fatigue and fever.   HENT: Negative.  Negative for trouble swallowing and voice change.    Eyes: Negative.  Negative for photophobia, pain, discharge, redness, itching and visual disturbance.   Respiratory: Negative.  Negative for chest tightness and shortness of breath.    Cardiovascular: Negative.  Negative for chest pain.   Gastrointestinal: Negative.  Negative for abdominal pain, constipation, diarrhea and vomiting.   Endocrine: Negative for cold intolerance, heat intolerance, polydipsia, polyphagia and polyuria.   Genitourinary: Negative.    Musculoskeletal: Negative.    Skin: Negative.    Allergic/Immunologic: Negative.    Neurological: Negative.  Negative for dizziness, syncope, light-headedness and headaches.   Hematological: Negative.    Psychiatric/Behavioral: Negative.     All other systems reviewed and are negative.      Historical Information   Past Medical History:   Diagnosis Date    Anemia     Anxiety     Bipolar 1 disorder (HCC)     GERD (gastroesophageal reflux disease)     HTN (hypertension)     Hyperlipidemia     Hypertension     Hypothyroidism      Past Surgical History:   Procedure Laterality Date     SECTION       SECTION      HYSTERECTOMY      THYROIDECTOMY      THYROIDECTOMY      TOTAL ABDOMINAL HYSTERECTOMY W/ BILATERAL SALPINGOOPHORECTOMY      UPPER GASTROINTESTINAL ENDOSCOPY       Social History   Social History     Substance and Sexual Activity   Alcohol Use Yes    Comment: ocassionally     Social History     Substance and Sexual Activity   Drug Use No     Social History     Tobacco Use   Smoking Status Every Day    Current packs/day: 0.25    Average packs/day: 0.3 packs/day for 20.0 years (5.0 ttl pk-yrs)    Types: Cigarettes    Passive exposure: Current   Smokeless Tobacco Never   Tobacco Comments    3-6 cigarettes per day.      Family History:    Family History   Problem Relation Age of Onset    Hypertension Mother     Ovarian cancer Mother     Hypothyroidism Mother     Hypertension Father     Hyperlipidemia Father     Heart disease Father     Colon cancer Father         Rectal cancer    Rectal cancer Father     Hypertension Sister     Hypertension Brother     Hypertension Daughter     Hypertension Sister     Hypertension Brother     No Known Problems Brother     No Known Problems Brother     Colon polyps Neg Hx        Meds/Allergies   Current Outpatient Medications   Medication Sig Dispense Refill    albuterol (ProAir HFA) 90 mcg/act inhaler Inhale 2 puffs every 6 (six) hours as needed for wheezing      amLODIPine (Norvasc) 5 mg tablet Take 5 mg by mouth daily      cholecalciferol (VITAMIN D3) 1,000 units tablet Take 2,000 Units by mouth daily      gabapentin (NEURONTIN) 600 MG tablet Take 600 mg by mouth daily at bedtime      hydroCHLOROthiazide 12.5 mg tablet Take 1 tablet by mouth in the morning      lamoTRIgine (LaMICtal) 25 mg tablet Take 25 mg by mouth daily      levothyroxine (Synthroid) 125 mcg tablet TAKE 1 TABLET 6 DAYS OF THE WEEK, TAKE ONE AND ONE HALF TABLETS ON SUNDAYS (Patient taking differently: TAKE 1 TABLET DAILY) 32 tablet 5    losartan (COZAAR) 100 MG tablet Take 100 mg by mouth daily      metFORMIN (GLUCOPHAGE-XR) 500 mg 24 hr tablet Take 1 tablet (500 mg total) by mouth daily with dinner 90 tablet 3    Multiple Vitamin (MULTIVITAMIN) capsule Take 1 capsule by mouth daily      Omega-3 Fatty Acids (FISH OIL) 1,000 mg Take 1,000 mg by mouth daily      pantoprazole (PROTONIX) 40 mg tablet TAKE 1 TABLET BY MOUTH EVERY DAY 30 tablet 3    psyllium (METAMUCIL) 58.6 % packet Take 1 packet by mouth daily      Thiamine HCl (VITAMIN B1 PO) Take 1,000 mg by mouth daily      amoxicillin-clavulanate (AUGMENTIN) 875-125 mg per tablet Take 1 tablet by mouth 2 (two) times a day (Patient not taking: Reported on 2/13/2025)      gabapentin (NEURONTIN)  "400 mg capsule Take 400 mg by mouth daily at bedtime (Patient not taking: Reported on 10/31/2024)      hydrochlorothiazide (HYDRODIURIL) 25 mg tablet Take 25 mg by mouth daily (Patient not taking: Reported on 10/31/2024)       No current facility-administered medications for this visit.     Allergies   Allergen Reactions    Lisinopril Cough       Objective   Vitals: Blood pressure 120/80, pulse 81, height 5' 5\" (1.651 m), weight 89.3 kg (196 lb 12.8 oz), SpO2 96%.    Physical Exam  Vitals reviewed.   Constitutional:       Appearance: She is well-developed.   HENT:      Head: Normocephalic and atraumatic.   Eyes:      Conjunctiva/sclera: Conjunctivae normal.      Pupils: Pupils are equal, round, and reactive to light.   Cardiovascular:      Rate and Rhythm: Normal rate and regular rhythm.      Pulses: no weak pulses.           Dorsalis pedis pulses are 1+ on the right side and 1+ on the left side.        Posterior tibial pulses are 1+ on the right side and 1+ on the left side.      Heart sounds: Normal heart sounds.   Pulmonary:      Effort: Pulmonary effort is normal.      Breath sounds: Normal breath sounds.   Abdominal:      General: Bowel sounds are normal.      Palpations: Abdomen is soft.   Musculoskeletal:         General: Normal range of motion.      Cervical back: Normal range of motion and neck supple.   Feet:      Right foot:      Skin integrity: No ulcer, skin breakdown, erythema, warmth, callus or dry skin.      Left foot:      Skin integrity: No ulcer, skin breakdown, erythema, warmth, callus or dry skin.   Skin:     General: Skin is warm and dry.   Neurological:      Mental Status: She is alert and oriented to person, place, and time.   Psychiatric:         Behavior: Behavior normal.         Thought Content: Thought content normal.         Judgment: Judgment normal.       Patient's shoes and socks removed.    Right Foot/Ankle   Right Foot Inspection  Skin Exam: skin normal and skin intact. No dry skin, " no warmth, no callus, no erythema, no maceration, no abnormal color, no pre-ulcer, no ulcer and no callus.     Toe Exam: ROM and strength within normal limits.     Sensory   Monofilament testing: intact    Vascular  Capillary refills: < 3 seconds  The right DP pulse is 1+. The right PT pulse is 1+.     Left Foot/Ankle  Left Foot Inspection  Skin Exam: skin normal and skin intact. No dry skin, no warmth, no erythema, no maceration, normal color, no pre-ulcer, no ulcer and no callus.     Toe Exam: ROM and strength within normal limits.     Sensory   Monofilament testing: intact    Vascular  Capillary refills: < 3 seconds  The left DP pulse is 1+. The left PT pulse is 1+.     Assign Risk Category  No deformity present  No loss of protective sensation  No weak pulses  Risk: 0        Lab Results:   Lab Results   Component Value Date/Time    Hemoglobin A1C 6.2 (H) 02/04/2025 09:44 AM    Hemoglobin A1C 6.6 (H) 09/19/2024 09:08 AM    White Blood Cell Count 7.1 02/04/2025 09:44 AM    White Blood Cell Count 6.2 09/19/2024 09:08 AM    Hemoglobin 14.2 02/04/2025 09:44 AM    Hemoglobin 14.3 09/19/2024 09:08 AM    HCT 43.0 02/04/2025 09:44 AM    HCT 43.4 09/19/2024 09:08 AM    MCV 93 02/04/2025 09:44 AM    MCV 93 09/19/2024 09:08 AM    Platelet Count 254 02/04/2025 09:44 AM    Platelet Count 252 09/19/2024 09:08 AM    BUN 13 02/04/2025 09:47 AM    BUN 13 02/04/2025 09:44 AM    BUN 11 09/19/2024 09:08 AM    Potassium 4.2 02/04/2025 09:47 AM    Potassium 4.5 02/04/2025 09:44 AM    Potassium 4.7 09/19/2024 09:08 AM    Chloride 103 02/04/2025 09:47 AM    Chloride 103 02/04/2025 09:44 AM    Chloride 105 09/19/2024 09:08 AM    CO2 24 02/04/2025 09:47 AM    CO2 26 02/04/2025 09:44 AM    CO2 24 09/19/2024 09:08 AM    Creatinine 0.69 02/04/2025 09:47 AM    Creatinine 0.66 02/04/2025 09:44 AM    Creatinine 0.76 09/19/2024 09:08 AM    AST 30 02/04/2025 09:47 AM    AST 29 02/04/2025 09:44 AM    AST 54 (H) 09/19/2024 09:08 AM    ALT 54 (H)  "02/04/2025 09:47 AM    ALT 54 (H) 02/04/2025 09:44 AM    ALT 95 (H) 09/19/2024 09:08 AM    ALT (SGPT) 62 (H) 02/04/2025 09:47 AM    Protein, Total 6.5 02/04/2025 09:47 AM    Protein, Total 6.4 02/04/2025 09:44 AM    Protein, Total 6.7 09/19/2024 09:08 AM    Albumin 4.4 02/04/2025 09:47 AM    Albumin 4.3 02/04/2025 09:44 AM    Albumin 4.4 09/19/2024 09:08 AM    Globulin, Total 2.1 02/04/2025 09:47 AM    Globulin, Total 2.1 02/04/2025 09:44 AM    Globulin, Total 2.3 09/19/2024 09:08 AM    HDL 39 (L) 02/04/2025 09:44 AM    HDL 37 (L) 09/19/2024 09:08 AM    Triglycerides 133 02/04/2025 09:44 AM    Triglycerides 162 (H) 09/19/2024 09:08 AM       Portions of the record may have been created with voice recognition software. Occasional wrong word or \"sound a like\" substitutions may have occurred due to the inherent limitations of voice recognition software. Read the chart carefully and recognize, using context, where substitutions have occurred.    "

## 2025-02-17 NOTE — TELEPHONE ENCOUNTER
Spoke to patient  She notes that she has not missed any doses and does not eat or drink for at least 30 minutes after taking it   She also said that she takes all of her vitamins at night Admission Reconciliation is Completed  Discharge Reconciliation is Not Complete Admission Reconciliation is Completed  Discharge Reconciliation is Completed

## 2025-03-05 DIAGNOSIS — E11.65 TYPE 2 DIABETES MELLITUS WITH HYPERGLYCEMIA, WITHOUT LONG-TERM CURRENT USE OF INSULIN (HCC): ICD-10-CM

## 2025-03-05 RX ORDER — METFORMIN HYDROCHLORIDE 500 MG/1
500 TABLET, EXTENDED RELEASE ORAL
Qty: 90 TABLET | Refills: 1 | Status: SHIPPED | OUTPATIENT
Start: 2025-03-05

## 2025-03-26 ENCOUNTER — TELEPHONE (OUTPATIENT)
Dept: GASTROENTEROLOGY | Facility: CLINIC | Age: 60
End: 2025-03-26

## 2025-03-26 ENCOUNTER — ANESTHESIA EVENT (OUTPATIENT)
Dept: ANESTHESIOLOGY | Facility: AMBULATORY SURGERY CENTER | Age: 60
End: 2025-03-26

## 2025-03-26 ENCOUNTER — ANESTHESIA (OUTPATIENT)
Dept: ANESTHESIOLOGY | Facility: AMBULATORY SURGERY CENTER | Age: 60
End: 2025-03-26

## 2025-04-08 ENCOUNTER — ANESTHESIA EVENT (OUTPATIENT)
Dept: GASTROENTEROLOGY | Facility: AMBULATORY SURGERY CENTER | Age: 60
End: 2025-04-08

## 2025-04-08 ENCOUNTER — ANESTHESIA (OUTPATIENT)
Dept: GASTROENTEROLOGY | Facility: AMBULATORY SURGERY CENTER | Age: 60
End: 2025-04-08

## 2025-04-08 ENCOUNTER — HOSPITAL ENCOUNTER (OUTPATIENT)
Dept: GASTROENTEROLOGY | Facility: AMBULATORY SURGERY CENTER | Age: 60
Discharge: HOME | End: 2025-04-08
Payer: MEDICARE

## 2025-04-08 VITALS
TEMPERATURE: 97.7 F | HEART RATE: 67 BPM | SYSTOLIC BLOOD PRESSURE: 116 MMHG | WEIGHT: 195 LBS | HEIGHT: 65 IN | BODY MASS INDEX: 32.49 KG/M2 | DIASTOLIC BLOOD PRESSURE: 82 MMHG | RESPIRATION RATE: 24 BRPM | OXYGEN SATURATION: 97 %

## 2025-04-08 DIAGNOSIS — Z86.0100 HISTORY OF COLON POLYPS: ICD-10-CM

## 2025-04-08 PROCEDURE — 45385 COLONOSCOPY W/LESION REMOVAL: CPT | Performed by: INTERNAL MEDICINE

## 2025-04-08 PROCEDURE — 88305 TISSUE EXAM BY PATHOLOGIST: CPT | Performed by: STUDENT IN AN ORGANIZED HEALTH CARE EDUCATION/TRAINING PROGRAM

## 2025-04-08 RX ORDER — PROPOFOL 10 MG/ML
INJECTION, EMULSION INTRAVENOUS AS NEEDED
Status: DISCONTINUED | OUTPATIENT
Start: 2025-04-08 | End: 2025-04-08

## 2025-04-08 RX ORDER — SODIUM CHLORIDE, SODIUM LACTATE, POTASSIUM CHLORIDE, CALCIUM CHLORIDE 600; 310; 30; 20 MG/100ML; MG/100ML; MG/100ML; MG/100ML
75 INJECTION, SOLUTION INTRAVENOUS CONTINUOUS
Status: DISCONTINUED | OUTPATIENT
Start: 2025-04-08 | End: 2025-04-12 | Stop reason: HOSPADM

## 2025-04-08 RX ADMIN — PROPOFOL 50 MG: 10 INJECTION, EMULSION INTRAVENOUS at 07:33

## 2025-04-08 RX ADMIN — PROPOFOL 50 MG: 10 INJECTION, EMULSION INTRAVENOUS at 07:37

## 2025-04-08 RX ADMIN — PROPOFOL 50 MG: 10 INJECTION, EMULSION INTRAVENOUS at 07:41

## 2025-04-08 RX ADMIN — SODIUM CHLORIDE, SODIUM LACTATE, POTASSIUM CHLORIDE, CALCIUM CHLORIDE 75 ML/HR: 600; 310; 30; 20 INJECTION, SOLUTION INTRAVENOUS at 07:21

## 2025-04-08 RX ADMIN — PROPOFOL 50 MG: 10 INJECTION, EMULSION INTRAVENOUS at 07:28

## 2025-04-08 RX ADMIN — PROPOFOL 50 MG: 10 INJECTION, EMULSION INTRAVENOUS at 07:30

## 2025-04-08 RX ADMIN — PROPOFOL 100 MG: 10 INJECTION, EMULSION INTRAVENOUS at 07:26

## 2025-04-08 NOTE — H&P
History and Physical -  [facility]  Wendi Young 59 y.o. female MRN: [id number]    HPI: Wendi Young is a 59 y.o. year old female who presents for   Colonoscopy.  She has a previous history of colon polyps- but these were hyperplastic   Also family history of colon polyps  REVIEW OF SYSTEMS: Per the HPI, and otherwise unremarkable.    Historical Information   Past Medical History:   Diagnosis Date    Anemia     Anxiety     Bipolar 1 disorder (HCC)     Colon polyp     GERD (gastroesophageal reflux disease)     HTN (hypertension)     Hyperlipidemia     Hypertension     Hypothyroidism      Past Surgical History:   Procedure Laterality Date     SECTION       SECTION      COLONOSCOPY      HYSTERECTOMY      THYROIDECTOMY      THYROIDECTOMY      TOTAL ABDOMINAL HYSTERECTOMY W/ BILATERAL SALPINGOOPHORECTOMY      UPPER GASTROINTESTINAL ENDOSCOPY       Social History   Social History     Substance and Sexual Activity   Alcohol Use Yes    Comment: ocassionally     Social History     Substance and Sexual Activity   Drug Use No     Social History     Tobacco Use   Smoking Status Every Day    Current packs/day: 0.25    Average packs/day: 0.3 packs/day for 20.0 years (5.0 ttl pk-yrs)    Types: Cigarettes    Passive exposure: Current   Smokeless Tobacco Never   Tobacco Comments    3-6 cigarettes per day.      Family History   Problem Relation Age of Onset    Hypertension Mother     Ovarian cancer Mother     Hypothyroidism Mother     Hypertension Father     Hyperlipidemia Father     Heart disease Father     Colon cancer Father         Rectal cancer    Rectal cancer Father     Hypertension Sister     Hypertension Brother     Hypertension Daughter     Hypertension Sister     Hypertension Brother     No Known Problems Brother     No Known Problems Brother     Colon polyps Neg Hx        Meds/Allergies       Current Outpatient Medications:     amLODIPine (Norvasc) 5 mg tablet     "cholecalciferol (VITAMIN D3) 1,000 units tablet    gabapentin (NEURONTIN) 600 MG tablet    hydroCHLOROthiazide 12.5 mg tablet    lamoTRIgine (LaMICtal) 25 mg tablet    levothyroxine (Synthroid) 125 mcg tablet    losartan (COZAAR) 100 MG tablet    metFORMIN (GLUCOPHAGE-XR) 500 mg 24 hr tablet    Multiple Vitamin (MULTIVITAMIN) capsule    Omega-3 Fatty Acids (FISH OIL) 1,000 mg    pantoprazole (PROTONIX) 40 mg tablet    psyllium (METAMUCIL) 58.6 % packet    rosuvastatin (CRESTOR) 5 mg tablet    Thiamine HCl (VITAMIN B1 PO)    albuterol (ProAir HFA) 90 mcg/act inhaler    amoxicillin-clavulanate (AUGMENTIN) 875-125 mg per tablet    gabapentin (NEURONTIN) 400 mg capsule    hydrochlorothiazide (HYDRODIURIL) 25 mg tablet    Current Facility-Administered Medications:     lactated ringers infusion, 75 mL/hr, Intravenous, Continuous, 75 mL/hr at 04/08/25 0721    Allergies   Allergen Reactions    Lisinopril Cough       Objective     /70   Pulse 72   Temp 97.7 °F (36.5 °C) (Temporal)   Resp 16   Ht 5' 5\" (1.651 m)   Wt 88.5 kg (195 lb)   SpO2 96%   BMI 32.45 kg/m²     PHYSICAL EXAM    Gen: NAD AAOx3  Head: Normocephalic, Atraumatic  CV: S1S2 RRR no m/r/g  CHEST: Clear b/l no c/r/w  ABD: soft, +BS NT/ND  EXT: no edema    ASSESSMENT/PLAN:  This is a 59 y.o. year old female here for colonoscopy, and she is stable and optimized for her procedure.        "

## 2025-04-08 NOTE — ANESTHESIA PREPROCEDURE EVALUATION
Procedure:  COLONOSCOPY    Relevant Problems   CARDIO   (+) Essential hypertension      ENDO   (+) Postoperative hypothyroidism      GI/HEPATIC   (+) Gastroesophageal reflux disease      HEMATOLOGY   (+) Anemia        Physical Exam    Airway    Mallampati score: II  TM Distance: >3 FB  Neck ROM: full     Dental   No notable dental hx     Cardiovascular  Rhythm: regular, Rate: normal    Pulmonary   Breath sounds clear to auscultation    Other Findings  post-pubertal.      Anesthesia Plan  ASA Score- 2     Anesthesia Type- IV sedation with anesthesia with ASA Monitors.         Additional Monitors:     Airway Plan:            Plan Factors-Exercise tolerance (METS): >4 METS.    Chart reviewed.   Existing labs reviewed. Patient summary reviewed.    Patient is not a current smoker.              Induction- intravenous.    Postoperative Plan-         Informed Consent- Anesthetic plan and risks discussed with patient.  I personally reviewed this patient with the CRNA. Discussed and agreed on the Anesthesia Plan with the CRNA..      NPO Status:  No vitals data found for the desired time range.

## 2025-04-08 NOTE — ANESTHESIA POSTPROCEDURE EVALUATION
Post-Op Assessment Note    CV Status:  Stable  Pain Score: 0    Pain management: adequate       Mental Status:  Alert and awake   Hydration Status:  Euvolemic and stable   PONV Controlled:  None   Airway Patency:  Patent     Post Op Vitals Reviewed: Yes    No anethesia notable event occurred.    Staff: CRNA       Last Filed PACU Vitals:  Vitals Value Taken Time   Temp     Pulse 70 04/08/25 0745   /56 04/08/25 0745   Resp 17 04/08/25 0745   SpO2 97 % 04/08/25 0745

## 2025-04-09 DIAGNOSIS — E03.9 HYPOTHYROIDISM, UNSPECIFIED TYPE: ICD-10-CM

## 2025-04-09 RX ORDER — LEVOTHYROXINE SODIUM 125 UG/1
TABLET ORAL
Qty: 30 TABLET | Refills: 5 | Status: SHIPPED | OUTPATIENT
Start: 2025-04-09

## 2025-04-10 PROCEDURE — 88305 TISSUE EXAM BY PATHOLOGIST: CPT | Performed by: STUDENT IN AN ORGANIZED HEALTH CARE EDUCATION/TRAINING PROGRAM

## 2025-04-11 ENCOUNTER — RESULTS FOLLOW-UP (OUTPATIENT)
Dept: ENDOCRINOLOGY | Facility: HOSPITAL | Age: 60
End: 2025-04-11

## 2025-04-11 NOTE — RESULT ENCOUNTER NOTE
Please call the patient regarding result.  Hemoglobin A1c is well-controlled at 6.0.  LDL is elevated at 142.  TSH, CBC and CMP look okay.

## 2025-04-14 ENCOUNTER — RESULTS FOLLOW-UP (OUTPATIENT)
Dept: GASTROENTEROLOGY | Facility: CLINIC | Age: 60
End: 2025-04-14

## 2025-04-14 NOTE — RESULT ENCOUNTER NOTE
Called the patient.  She had hyperplastic polyps.  One of the polyps was fairly large.  Recall exam for 7 years.  Family history of colon polyps.

## 2025-04-17 ENCOUNTER — RESULTS FOLLOW-UP (OUTPATIENT)
Dept: GASTROENTEROLOGY | Facility: CLINIC | Age: 60
End: 2025-04-17

## 2025-04-17 NOTE — RESULT ENCOUNTER NOTE
Patient with ultrasound and elasticity study.  Findings consistent with hepatic steatosis.  Elasticity study favorable mild stiffness.  Mean value 1.16 mm/S low risk for significant fibrosis.  Will discuss with patient at time of visit next week.  Copy patient's PCP Dr. Easley

## 2025-04-22 ENCOUNTER — OFFICE VISIT (OUTPATIENT)
Dept: GASTROENTEROLOGY | Facility: CLINIC | Age: 60
End: 2025-04-22
Payer: MEDICARE

## 2025-04-22 VITALS
BODY MASS INDEX: 32.99 KG/M2 | SYSTOLIC BLOOD PRESSURE: 122 MMHG | WEIGHT: 198 LBS | DIASTOLIC BLOOD PRESSURE: 74 MMHG | HEIGHT: 65 IN

## 2025-04-22 DIAGNOSIS — Z12.11 SCREENING FOR COLON CANCER: ICD-10-CM

## 2025-04-22 DIAGNOSIS — K21.9 GASTROESOPHAGEAL REFLUX DISEASE WITHOUT ESOPHAGITIS: Primary | ICD-10-CM

## 2025-04-22 DIAGNOSIS — K75.81 METABOLIC DYSFUNCTION-ASSOCIATED STEATOHEPATITIS (MASH): ICD-10-CM

## 2025-04-22 PROCEDURE — 99213 OFFICE O/P EST LOW 20 MIN: CPT | Performed by: INTERNAL MEDICINE

## 2025-04-22 NOTE — ASSESSMENT & PLAN NOTE
Patient with mild elevation of her AST ALT.  Most recent LFTs however were normal.  FibroSure test showed low risk for fibrosis with a very low score as did her elasticity study with F0 to F1 score.  -Advise exercise and weight loss  -BMI is 32  -Only no elasticity of FibroSure study for 2 years ,check routine labs in a year  Orders:    Comprehensive metabolic panel; Future    CBC and differential; Future

## 2025-04-22 NOTE — PATIENT INSTRUCTIONS
"Name: Wendi Young      : 1965      MRN: [id number]  Encounter Provider: Alfonso Carrero MD  Encounter Date: 2025   Encounter department: [facility]  :  Assessment & Plan  Gastroesophageal reflux disease without esophagitis  Patient with some chronic GERD.  Upper endoscopy  no significant pathology.  Continue pantoprazole       Metabolic dysfunction-associated steatohepatitis (MASH)  Patient with mild elevation of her AST ALT.  Most recent LFTs however were normal.  FibroSure test showed low risk for fibrosis with a very low score as did her elasticity study with F0 to F1 score.  -Advise exercise and weight loss  -BMI is 32  -Only no elasticity of FibroSure study for 2 years ,check routine labs in a year  Orders:    Comprehensive metabolic panel; Future    CBC and differential; Future    Screening for colon cancer  With colonoscopy  and then most recently this year.  In  she had a large rectal polyp which was 10 mm but was hyperplastic.  I took off a sigmoid polyp this year and this was 8 mm but a hyperplastic polyp.  From what I can see she has never had a history of adenomas.  Father  of some type of \"colon blockage\" but he was in his 80s  Follow-up in 7 years.  Could consider changing to 5 years.     Follow up 1 year  "

## 2025-04-22 NOTE — PROGRESS NOTES
"Name: Wendi Young      : 1965      MRN: [id number]  Encounter Provider: Alfonso Carrero MD  Encounter Date: 2025   Encounter department: [facility]  :  Assessment & Plan  Gastroesophageal reflux disease without esophagitis  Patient with some chronic GERD.  Upper endoscopy  no significant pathology.  Continue pantoprazole       Metabolic dysfunction-associated steatohepatitis (MASH)  Patient with mild elevation of her AST ALT.  Most recent LFTs however were normal.  FibroSure test showed low risk for fibrosis with a very low score as did her elasticity study with F0 to F1 score.  -Advise exercise and weight loss  -BMI is 32  -Only no elasticity of FibroSure study for 2 years ,check routine labs in a year  Orders:    Comprehensive metabolic panel; Future    CBC and differential; Future    Screening for colon cancer  With colonoscopy  and then most recently this year.  In  she had a large rectal polyp which was 10 mm but was hyperplastic.  I took off a sigmoid polyp this year and this was 8 mm but a hyperplastic polyp.  From what I can see she has never had a history of adenomas.  Father  of some type of \"colon blockage\" but he was in his 80s  Follow-up in 7 years.  Could consider changing to 5 years.     Follow up 1 year       History of Present Illness   HPI  Wendi Young is a 59 y.o. female who follow-up after recent colonoscopy and follow-up for her fatty liver.  Overall she is doing well without significant GI symptoms.  We did review findings with respect to her liver function studies and workup.  Ultrasonography showed some fatty infiltration in the liver.  Gallbladder was negative.  She had an incidental right renal cyst which was not complex.  Elasticity study showed a low score consistent with low risk for significant fibrosis F0 to F1.  Patient also had liver fibrosis panel which similarly showed very low levels.  Most recent " "transaminases were within normal range she has not lost weight.  She has had trouble getting out to exercise but intends to do so in the future.  Patient did have follow-up colonoscopy earlier this month.  Patient did have an 8 mm polyp removed in the sigmoid colon.  This was hyperplastic.  Patient has never had adenomas but she has had some fairly large hyperplastic polyp.  We had put her in for recall      Review of Systems       Objective   /74   Ht 5' 5\" (1.651 m)   Wt 89.8 kg (198 lb)   BMI 32.95 kg/m²      Physical Exam  .General Appearance: NAD, cooperative, alert  Eyes: Anicteric, conjunctiva pink  ENT:  Normocephalic, atraumatic, normal mucosa.    Neck:    Supple, symmetrical, trachea midline  Resp:  Clear to auscultation bilaterally; no rales, rhonchi or wheezing; respirations unlabored   CV:  S1 S2, Regular rate and rhythm; no murmur, rub, or gallop.  GI:  Soft, non-tender, non-distended; normal bowel sounds; no masses, no organomegaly -mild central obesity  Rectal: Deferred  Musculoskeletal: No cyanosis, clubbing or edema. Normal ROM.  Skin:     No jaundice, rashes, or lesions   Heme/Lymph: No palpable cervical lymphadenopathy  Psych: Normal affect, good eye contact  Neuro: No gross deficits, AAOx3       "

## 2025-04-22 NOTE — ASSESSMENT & PLAN NOTE
Patient with some chronic GERD.  Upper endoscopy 2020 no significant pathology.  Continue pantoprazole

## 2025-05-05 DIAGNOSIS — E03.9 HYPOTHYROIDISM, UNSPECIFIED TYPE: ICD-10-CM

## 2025-05-06 RX ORDER — LEVOTHYROXINE SODIUM 125 UG/1
TABLET ORAL
Qty: 90 TABLET | Refills: 1 | Status: SHIPPED | OUTPATIENT
Start: 2025-05-06

## 2025-05-16 ENCOUNTER — RESULTS FOLLOW-UP (OUTPATIENT)
Dept: ENDOCRINOLOGY | Facility: HOSPITAL | Age: 60
End: 2025-05-16

## 2025-05-16 LAB
ALBUMIN SERPL-MCNC: 4.6 G/DL (ref 3.8–4.9)
ALP SERPL-CCNC: 102 IU/L (ref 44–121)
ALT SERPL-CCNC: 39 IU/L (ref 0–32)
AST SERPL-CCNC: 22 IU/L (ref 0–40)
BASOPHILS # BLD AUTO: 0 X10E3/UL (ref 0–0.2)
BASOPHILS NFR BLD AUTO: 1 %
BILIRUB SERPL-MCNC: 0.4 MG/DL (ref 0–1.2)
BUN SERPL-MCNC: 14 MG/DL (ref 8–27)
BUN/CREAT SERPL: 20 (ref 12–28)
CALCIUM SERPL-MCNC: 9.1 MG/DL (ref 8.7–10.3)
CHLORIDE SERPL-SCNC: 104 MMOL/L (ref 96–106)
CHOLEST SERPL-MCNC: 134 MG/DL (ref 100–199)
CHOLEST/HDLC SERPL: 3.7 RATIO (ref 0–4.4)
CO2 SERPL-SCNC: 22 MMOL/L (ref 20–29)
CREAT SERPL-MCNC: 0.71 MG/DL (ref 0.57–1)
EGFR: 97 ML/MIN/1.73
EOSINOPHIL # BLD AUTO: 0.2 X10E3/UL (ref 0–0.4)
EOSINOPHIL NFR BLD AUTO: 3 %
ERYTHROCYTE [DISTWIDTH] IN BLOOD BY AUTOMATED COUNT: 12.8 % (ref 11.7–15.4)
EST. AVERAGE GLUCOSE BLD GHB EST-MCNC: 126 MG/DL
GLOBULIN SER-MCNC: 1.9 G/DL (ref 1.5–4.5)
GLUCOSE SERPL-MCNC: 116 MG/DL (ref 70–99)
HBA1C MFR BLD: 6 % (ref 4.8–5.6)
HCT VFR BLD AUTO: 43.3 % (ref 34–46.6)
HDLC SERPL-MCNC: 36 MG/DL
HGB BLD-MCNC: 14.2 G/DL (ref 11.1–15.9)
IMM GRANULOCYTES # BLD: 0 X10E3/UL (ref 0–0.1)
IMM GRANULOCYTES NFR BLD: 0 %
LDLC SERPL CALC-MCNC: 76 MG/DL (ref 0–99)
LYMPHOCYTES # BLD AUTO: 1.6 X10E3/UL (ref 0.7–3.1)
LYMPHOCYTES NFR BLD AUTO: 27 %
MCH RBC QN AUTO: 30.3 PG (ref 26.6–33)
MCHC RBC AUTO-ENTMCNC: 32.8 G/DL (ref 31.5–35.7)
MCV RBC AUTO: 92 FL (ref 79–97)
MONOCYTES # BLD AUTO: 0.5 X10E3/UL (ref 0.1–0.9)
MONOCYTES NFR BLD AUTO: 8 %
NEUTROPHILS # BLD AUTO: 3.8 X10E3/UL (ref 1.4–7)
NEUTROPHILS NFR BLD AUTO: 61 %
PLATELET # BLD AUTO: 249 X10E3/UL (ref 150–450)
POTASSIUM SERPL-SCNC: 4.4 MMOL/L (ref 3.5–5.2)
PROT SERPL-MCNC: 6.5 G/DL (ref 6–8.5)
RBC # BLD AUTO: 4.69 X10E6/UL (ref 3.77–5.28)
SL AMB VLDL CHOLESTEROL CALC: 22 MG/DL (ref 5–40)
SODIUM SERPL-SCNC: 142 MMOL/L (ref 134–144)
T4 FREE SERPL-MCNC: 1.53 NG/DL (ref 0.82–1.77)
TRIGL SERPL-MCNC: 119 MG/DL (ref 0–149)
TSH SERPL DL<=0.005 MIU/L-ACNC: 0.74 UIU/ML (ref 0.45–4.5)
WBC # BLD AUTO: 6 X10E3/UL (ref 3.4–10.8)

## 2025-05-22 ENCOUNTER — OFFICE VISIT (OUTPATIENT)
Dept: ENDOCRINOLOGY | Facility: HOSPITAL | Age: 60
End: 2025-05-22
Payer: MEDICARE

## 2025-05-22 VITALS
WEIGHT: 200.6 LBS | BODY MASS INDEX: 33.42 KG/M2 | DIASTOLIC BLOOD PRESSURE: 80 MMHG | HEART RATE: 72 BPM | HEIGHT: 65 IN | SYSTOLIC BLOOD PRESSURE: 136 MMHG

## 2025-05-22 DIAGNOSIS — E78.5 HYPERLIPIDEMIA, UNSPECIFIED HYPERLIPIDEMIA TYPE: ICD-10-CM

## 2025-05-22 DIAGNOSIS — E11.65 TYPE 2 DIABETES MELLITUS WITH HYPERGLYCEMIA, WITHOUT LONG-TERM CURRENT USE OF INSULIN (HCC): Primary | ICD-10-CM

## 2025-05-22 DIAGNOSIS — E03.9 HYPOTHYROIDISM, UNSPECIFIED TYPE: ICD-10-CM

## 2025-05-22 DIAGNOSIS — E55.9 VITAMIN D DEFICIENCY: ICD-10-CM

## 2025-05-22 DIAGNOSIS — E89.0 POSTOPERATIVE HYPOTHYROIDISM: ICD-10-CM

## 2025-05-22 PROCEDURE — 99214 OFFICE O/P EST MOD 30 MIN: CPT | Performed by: NURSE PRACTITIONER

## 2025-05-22 NOTE — PATIENT INSTRUCTIONS
Be mindful of diet.     Stay active and stay hydrated.     For now, continue Synthroid 125 mcg - 7 days of the week.     Metformin  mg once daily with dinner.     Complete lab work as prescribed.     Contact the office with any change in symptoms.     Continue Crestor 5 mg daily at bedtime.     Continue Losartan.

## 2025-05-22 NOTE — ASSESSMENT & PLAN NOTE
Orders:    Albumin / creatinine urine ratio; Future    CBC and differential; Future    Comprehensive metabolic panel; Future    Hemoglobin A1C; Future    T4, free; Future    TSH, 3rd generation; Future

## 2025-05-22 NOTE — PROGRESS NOTES
Name: Wendi Young      : 1965      MRN: [id number]  Encounter Provider: JERAMY Mcnally  Encounter Date: 2025   Encounter department: [facility]      :  Assessment & Plan  Type 2 diabetes mellitus with hyperglycemia, without long-term current use of insulin (HCC)    Lab Results   Component Value Date    HGBA1C 6.0 (H) 05/15/2025       Orders:    Albumin / creatinine urine ratio; Future    CBC and differential; Future    Comprehensive metabolic panel; Future    Hemoglobin A1C; Future    T4, free; Future    TSH, 3rd generation; Future    Postoperative hypothyroidism    Orders:    Albumin / creatinine urine ratio; Future    CBC and differential; Future    Comprehensive metabolic panel; Future    Hemoglobin A1C; Future    T4, free; Future    TSH, 3rd generation; Future    Hypothyroidism, unspecified type    Orders:    Albumin / creatinine urine ratio; Future    CBC and differential; Future    Comprehensive metabolic panel; Future    Hemoglobin A1C; Future    T4, free; Future    TSH, 3rd generation; Future    Hyperlipidemia, unspecified hyperlipidemia type    Orders:    Albumin / creatinine urine ratio; Future    CBC and differential; Future    Comprehensive metabolic panel; Future    Hemoglobin A1C; Future    T4, free; Future    TSH, 3rd generation; Future    Vitamin D deficiency    Orders:    Albumin / creatinine urine ratio; Future    CBC and differential; Future    Comprehensive metabolic panel; Future    Hemoglobin A1C; Future    T4, free; Future    TSH, 3rd generation; Future    Plan:  1. Hypothyroidism postoperative: Recent TSH and free T4 are normal.  For now, she will continue levothyroxine 125 mcg daily.  TSH and free T4 prior to next office visit.  2.Type 2 Diabetes:  Most recent hemoglobin A1c is 6.0.  Discussed the difference between prediabetes and type 2 diabetes.  She will start metformin  mg with dinner.  Encouraged diet,  exercise and lifestyle modification.  Follow-up with diabetes education for MNT.  Check CMP, A1c, lipid panel before next appointment.  3. Vitamin-D deficiency:  Continue supplementation.  4. Hyperlipidemia: LDL and total cholesterol are much improved.  She will continue Crestor 5 mg daily at bedtime.  5. Hypertension:  Continue Losartan.  Check comprehensive metabolic panel prior to next visit.      History of Present Illness     59 y.o. year old female with history of hypothyroidism after surgery, prediabetes and Type 2 Diabetes who presents for a follow-up appointment.  In 2010, she underwent surgery for nodules and fortunately pathology came back benign.  She is treated with Synthroid brand 125 mcg daily.  Her most recent TSH from May 15, 2025 is 0.742 with free T4 of 1.52.  No neck compressive symptoms.  She presents today overall feeling well.      For her prediabetes, her most recent hemoglobin A1c is 6.0 from May 15, 2025 with a fasting blood sugar of 116.     For her vitamin-D deficiency, she supplements with 2000 units of vitamin D3 daily.  Most recent 25 hydroxy vitamin-D level is 39.7.    Review of Systems   Constitutional: Negative.  Negative for chills, fatigue and fever.   HENT: Negative.  Negative for trouble swallowing and voice change.    Eyes: Negative.  Negative for photophobia, pain, discharge, redness, itching and visual disturbance.   Respiratory: Negative.  Negative for chest tightness and shortness of breath.    Cardiovascular: Negative.  Negative for chest pain.   Gastrointestinal: Negative.  Negative for abdominal pain, constipation, diarrhea and vomiting.   Endocrine: Negative for cold intolerance, heat intolerance, polydipsia, polyphagia and polyuria.   Genitourinary: Negative.    Musculoskeletal: Negative.    Skin: Negative.    Allergic/Immunologic: Negative.    Neurological: Negative.  Negative for dizziness, syncope, light-headedness and headaches.   Hematological: Negative.   "  Psychiatric/Behavioral: Negative.     All other systems reviewed and are negative.   as per HPI      Objective   /80   Pulse 72   Ht 5' 5\" (1.651 m)   Wt 91 kg (200 lb 9.6 oz)   BMI 33.38 kg/m²      Body mass index is 33.38 kg/m².  Wt Readings from Last 3 Encounters:   05/22/25 91 kg (200 lb 9.6 oz)   04/22/25 89.8 kg (198 lb)   04/08/25 88.5 kg (195 lb)     Physical Exam  Vitals reviewed.   Constitutional:       Appearance: She is well-developed.   HENT:      Head: Normocephalic and atraumatic.     Eyes:      Conjunctiva/sclera: Conjunctivae normal.      Pupils: Pupils are equal, round, and reactive to light.       Cardiovascular:      Rate and Rhythm: Normal rate and regular rhythm.      Heart sounds: Normal heart sounds.   Pulmonary:      Effort: Pulmonary effort is normal.      Breath sounds: Normal breath sounds.   Abdominal:      General: Bowel sounds are normal.      Palpations: Abdomen is soft.     Musculoskeletal:         General: Normal range of motion.      Cervical back: Normal range of motion and neck supple.     Skin:     General: Skin is warm and dry.     Neurological:      Mental Status: She is alert and oriented to person, place, and time.     Psychiatric:         Behavior: Behavior normal.         Thought Content: Thought content normal.         Judgment: Judgment normal.       Last Eye Exam: Not on file  Last Foot Exam: 02/13/2025  There are no preventive care reminders to display for this patient.      Labs: I have reviewed pertinent labs including:   Lab Results   Component Value Date    HGBA1C 6.0 (H) 05/15/2025    HGBA1C 6.2 (H) 02/04/2025    HGBA1C 6.6 (H) 09/19/2024      Lab Results   Component Value Date    CREATININE 0.71 05/15/2025    CREATININE 0.62 03/17/2025    CREATININE 0.69 02/04/2025    BUN 14 05/15/2025    K 4.4 05/15/2025     05/15/2025    CO2 22 05/15/2025      eGFRcr   Date Value Ref Range Status   03/17/2025 102 >=60 mL/min/1.73 m2 Final     Comment:     " "Estimated glomerular filtration rate (eGFR) is calculated without a race  coefficient. Values should be interpreted in the context of the patient's full  clinical presentation. Reference: Arleen Kaiser, et al. \"A unifying approach  for GFR estimation: recommendations of the NKF-ASN task force on reassessing the  inclusion of race in diagnosing kidney disease.\" American Journal of Kidney  Diseases (2021)     eGFR   Date Value Ref Range Status   05/15/2025 97 >59 mL/min/1.73 Final      HDL   Date Value Ref Range Status   05/15/2025 36 (L) >39 mg/dL Final     Triglycerides   Date Value Ref Range Status   05/15/2025 119 0 - 149 mg/dL Final     T. Chol/HDL Ratio   Date Value Ref Range Status   05/15/2025 3.7 0.0 - 4.4 ratio Final     Comment:                                       T. Chol/HDL Ratio                                              Men  Women                                1/2 Avg.Risk  3.4    3.3                                    Avg.Risk  5.0    4.4                                 2X Avg.Risk  9.6    7.1                                 3X Avg.Risk 23.4   11.0        ALT   Date Value Ref Range Status   05/15/2025 39 (H) 0 - 32 IU/L Final     ALANINE AMINOTRANSFERASE   Date Value Ref Range Status   03/17/2025 40 14 - 54 U/L Final     AST   Date Value Ref Range Status   05/15/2025 22 0 - 40 IU/L Final     ASPAR AMINOTRANSFERASE   Date Value Ref Range Status   03/17/2025 25 15 - 41 U/L Final     GGT   Date Value Ref Range Status   02/04/2025 24 0 - 60 IU/L Final     Alkaline Phosphatase   Date Value Ref Range Status   03/17/2025 96 38 - 126 U/L Final      No results found for: \"[id number]\"   Lab Results   Component Value Date    FREET4 1.53 05/15/2025          Discussed with the patient and all questioned fully answered. She will call me if any problems arise.      "

## 2025-06-10 DIAGNOSIS — E78.5 HYPERLIPIDEMIA, UNSPECIFIED HYPERLIPIDEMIA TYPE: ICD-10-CM

## 2025-06-11 RX ORDER — ROSUVASTATIN CALCIUM 5 MG/1
5 TABLET, COATED ORAL DAILY
Qty: 90 TABLET | Refills: 1 | Status: SHIPPED | OUTPATIENT
Start: 2025-06-11

## 2025-07-23 ENCOUNTER — OFFICE VISIT (OUTPATIENT)
Age: 60
End: 2025-07-23
Payer: MEDICARE

## 2025-07-23 VITALS
TEMPERATURE: 98.5 F | HEART RATE: 79 BPM | WEIGHT: 206 LBS | BODY MASS INDEX: 33.11 KG/M2 | DIASTOLIC BLOOD PRESSURE: 80 MMHG | OXYGEN SATURATION: 97 % | HEIGHT: 66 IN | SYSTOLIC BLOOD PRESSURE: 120 MMHG

## 2025-07-23 DIAGNOSIS — S91.209A AVULSED TOENAIL, INITIAL ENCOUNTER: Primary | ICD-10-CM

## 2025-07-23 PROCEDURE — 11730 AVULSION NAIL PLATE SIMPLE 1: CPT | Performed by: INTERNAL MEDICINE

## 2025-07-23 PROCEDURE — 99213 OFFICE O/P EST LOW 20 MIN: CPT | Performed by: INTERNAL MEDICINE

## 2025-07-23 RX ORDER — SODIUM FLUORIDE 1.1 G/100G
CREAM ORAL
COMMUNITY
Start: 2025-05-12

## 2025-07-23 NOTE — PROGRESS NOTES
"Nail removal    Date/Time: 2025 3:00 PM    Performed by: Kayli Easley DO  Authorized by: Kayli Easley DO    Patient location:  Bedside  [place] Protocol:  Consent: Verbal consent obtained  Risks and benefits: risks, benefits and alternatives were discussed    Location:     Foot:  L big toe  Pre-procedure details:     Skin preparation:  Alcohol and Betadine    Preparation: Patient was prepped and draped in the usual sterile fashion    Anesthesia (see MAR for exact dosages):     Anesthesia method:  None  Nail Removal:     Nail removed:  Complete    Nail bed sutured: no    Ingrown nail:     Wedge excision of skin: no      Nail matrix removed or ablated:  None  Nails trimmed:     Number of nails trimmed:  0  Post-procedure details:     Dressing:  4x4 sterile gauze    Patient tolerance of procedure:  Tolerated well, no immediate complications    Name: Wendi Young      : 1965      MRN: [id number]  Encounter Provider: Kayli Easley DO  Encounter Date: 2025   Encounter department: [facility] [place]  :  Assessment & Plan  Avulsed toenail, initial encounter    Orders:  •  Nail removal           History of Present Illness   [place] the beach, she stubbed her left great toe the toenail partially came off.  It has been loose and painful.  Her podiatrist is on vacation      Review of Systems    Objective   /80 (BP Location: Right arm, Patient Position: Sitting, Cuff Size: Large)   Pulse 79   Temp 98.5 °F (36.9 °C) (Temporal)   Ht 5' 5.5\" (1.664 m)   Wt 93.4 kg (206 lb)   SpO2 97%   BMI 33.76 kg/m²      Physical Exam  Feet:      Comments: Left great toenail partially avulsed        "